# Patient Record
Sex: FEMALE | Race: WHITE | NOT HISPANIC OR LATINO | Employment: OTHER | URBAN - METROPOLITAN AREA
[De-identification: names, ages, dates, MRNs, and addresses within clinical notes are randomized per-mention and may not be internally consistent; named-entity substitution may affect disease eponyms.]

---

## 2018-03-23 ENCOUNTER — APPOINTMENT (OUTPATIENT)
Dept: GENERAL RADIOLOGY | Facility: HOSPITAL | Age: 28
End: 2018-03-23

## 2018-03-23 ENCOUNTER — APPOINTMENT (OUTPATIENT)
Dept: ULTRASOUND IMAGING | Facility: HOSPITAL | Age: 28
End: 2018-03-23

## 2018-03-23 ENCOUNTER — HOSPITAL ENCOUNTER (EMERGENCY)
Facility: HOSPITAL | Age: 28
Discharge: LEFT BEFORE DISCHARGE | End: 2018-03-23
Attending: EMERGENCY MEDICINE | Admitting: EMERGENCY MEDICINE

## 2018-03-23 VITALS
BODY MASS INDEX: 30.12 KG/M2 | OXYGEN SATURATION: 100 % | RESPIRATION RATE: 28 BRPM | HEIGHT: 63 IN | WEIGHT: 170 LBS | SYSTOLIC BLOOD PRESSURE: 141 MMHG | TEMPERATURE: 100.1 F | DIASTOLIC BLOOD PRESSURE: 90 MMHG | HEART RATE: 126 BPM

## 2018-03-23 DIAGNOSIS — D64.9 SYMPTOMATIC ANEMIA: ICD-10-CM

## 2018-03-23 DIAGNOSIS — F19.10 POLYSUBSTANCE ABUSE (HCC): Primary | ICD-10-CM

## 2018-03-23 DIAGNOSIS — R18.8 OTHER ASCITES: ICD-10-CM

## 2018-03-23 DIAGNOSIS — E88.09 HYPOALBUMINEMIA: ICD-10-CM

## 2018-03-23 DIAGNOSIS — R19.5 OCCULT BLOOD POSITIVE STOOL: ICD-10-CM

## 2018-03-23 DIAGNOSIS — Z86.19 HISTORY OF HEPATITIS C: ICD-10-CM

## 2018-03-23 DIAGNOSIS — R00.0 TACHYCARDIA: ICD-10-CM

## 2018-03-23 LAB
ABO GROUP BLD: NORMAL
ALBUMIN SERPL-MCNC: 2.5 G/DL (ref 3.2–4.8)
ALBUMIN/GLOB SERPL: 0.8 G/DL (ref 1.5–2.5)
ALP SERPL-CCNC: 567 U/L (ref 25–100)
ALT SERPL W P-5'-P-CCNC: 60 U/L (ref 7–40)
AMMONIA BLD-SCNC: 45 UMOL/L (ref 19–60)
AMPHET+METHAMPHET UR QL: NEGATIVE
AMPHETAMINES UR QL: NEGATIVE
ANION GAP SERPL CALCULATED.3IONS-SCNC: 6 MMOL/L (ref 3–11)
AST SERPL-CCNC: 64 U/L (ref 0–33)
B-HCG UR QL: NEGATIVE
BACTERIA UR QL AUTO: ABNORMAL /HPF
BARBITURATES UR QL SCN: NEGATIVE
BASOPHILS # BLD AUTO: 0.01 10*3/MM3 (ref 0–0.2)
BASOPHILS NFR BLD AUTO: 0.3 % (ref 0–1)
BENZODIAZ UR QL SCN: POSITIVE
BILIRUB SERPL-MCNC: 1.7 MG/DL (ref 0.3–1.2)
BILIRUB UR QL STRIP: ABNORMAL
BLD GP AB SCN SERPL QL: NEGATIVE
BUN BLD-MCNC: 19 MG/DL (ref 9–23)
BUN/CREAT SERPL: 23.8 (ref 7–25)
BUPRENORPHINE SERPL-MCNC: NEGATIVE NG/ML
CALCIUM SPEC-SCNC: 7.5 MG/DL (ref 8.7–10.4)
CANNABINOIDS SERPL QL: NEGATIVE
CHLORIDE SERPL-SCNC: 115 MMOL/L (ref 99–109)
CLARITY UR: CLEAR
CO2 SERPL-SCNC: 16 MMOL/L (ref 20–31)
COCAINE UR QL: NEGATIVE
COLOR UR: ABNORMAL
CREAT BLD-MCNC: 0.8 MG/DL (ref 0.6–1.3)
DEPRECATED RDW RBC AUTO: 49.1 FL (ref 37–54)
DEVELOPER EXPIRATION DATE: ABNORMAL
DEVELOPER LOT NUMBER: ABNORMAL
EOSINOPHIL # BLD AUTO: 0.04 10*3/MM3 (ref 0–0.3)
EOSINOPHIL NFR BLD AUTO: 1 % (ref 0–3)
ERYTHROCYTE [DISTWIDTH] IN BLOOD BY AUTOMATED COUNT: 16.4 % (ref 11.3–14.5)
EXPIRATION DATE: ABNORMAL
FECAL OCCULT BLOOD SCREEN, POC: POSITIVE
GFR SERPL CREATININE-BSD FRML MDRD: 86 ML/MIN/1.73
GLOBULIN UR ELPH-MCNC: 3.1 GM/DL
GLUCOSE BLD-MCNC: 111 MG/DL (ref 70–100)
GLUCOSE UR STRIP-MCNC: NEGATIVE MG/DL
HCT VFR BLD AUTO: 24 % (ref 34.5–44)
HGB BLD-MCNC: 8.2 G/DL (ref 11.5–15.5)
HGB UR QL STRIP.AUTO: ABNORMAL
HYALINE CASTS UR QL AUTO: ABNORMAL /LPF
IMM GRANULOCYTES # BLD: 0.01 10*3/MM3 (ref 0–0.03)
IMM GRANULOCYTES NFR BLD: 0.3 % (ref 0–0.6)
INR PPP: 1.09 (ref 0.91–1.09)
KETONES UR QL STRIP: NEGATIVE
LEUKOCYTE ESTERASE UR QL STRIP.AUTO: ABNORMAL
LYMPHOCYTES # BLD AUTO: 0.5 10*3/MM3 (ref 0.6–4.8)
LYMPHOCYTES NFR BLD AUTO: 12.8 % (ref 24–44)
Lab: ABNORMAL
MCH RBC QN AUTO: 28.3 PG (ref 27–31)
MCHC RBC AUTO-ENTMCNC: 34.2 G/DL (ref 32–36)
MCV RBC AUTO: 82.8 FL (ref 80–99)
METHADONE UR QL SCN: NEGATIVE
MONOCYTES # BLD AUTO: 0.41 10*3/MM3 (ref 0–1)
MONOCYTES NFR BLD AUTO: 10.5 % (ref 0–12)
NEGATIVE CONTROL: NEGATIVE
NEUTROPHILS # BLD AUTO: 2.95 10*3/MM3 (ref 1.5–8.3)
NEUTROPHILS NFR BLD AUTO: 75.1 % (ref 41–71)
NITRITE UR QL STRIP: NEGATIVE
OPIATES UR QL: POSITIVE
OXYCODONE UR QL SCN: NEGATIVE
PCP UR QL SCN: NEGATIVE
PH UR STRIP.AUTO: <=5 [PH] (ref 5–8)
PLATELET # BLD AUTO: 57 10*3/MM3 (ref 150–450)
PMV BLD AUTO: 10.4 FL (ref 6–12)
POSITIVE CONTROL: POSITIVE
POTASSIUM BLD-SCNC: 3.4 MMOL/L (ref 3.5–5.5)
PROPOXYPH UR QL: NEGATIVE
PROT SERPL-MCNC: 5.6 G/DL (ref 5.7–8.2)
PROT UR QL STRIP: ABNORMAL
PROTHROMBIN TIME: 11.4 SECONDS (ref 9.6–11.5)
RBC # BLD AUTO: 2.9 10*6/MM3 (ref 3.89–5.14)
RBC # UR: ABNORMAL /HPF
REF LAB TEST METHOD: ABNORMAL
RH BLD: POSITIVE
S PYO AG THROAT QL: NEGATIVE
SODIUM BLD-SCNC: 137 MMOL/L (ref 132–146)
SP GR UR STRIP: 1.02 (ref 1–1.03)
SQUAMOUS #/AREA URNS HPF: ABNORMAL /HPF
TRICYCLICS UR QL SCN: NEGATIVE
UROBILINOGEN UR QL STRIP: ABNORMAL
WBC NRBC COR # BLD: 3.92 10*3/MM3 (ref 3.5–10.8)
WBC UR QL AUTO: ABNORMAL /HPF

## 2018-03-23 PROCEDURE — 81001 URINALYSIS AUTO W/SCOPE: CPT | Performed by: PHYSICIAN ASSISTANT

## 2018-03-23 PROCEDURE — 80306 DRUG TEST PRSMV INSTRMNT: CPT | Performed by: PHYSICIAN ASSISTANT

## 2018-03-23 PROCEDURE — 96360 HYDRATION IV INFUSION INIT: CPT

## 2018-03-23 PROCEDURE — 85025 COMPLETE CBC W/AUTO DIFF WBC: CPT | Performed by: PHYSICIAN ASSISTANT

## 2018-03-23 PROCEDURE — 82140 ASSAY OF AMMONIA: CPT | Performed by: PHYSICIAN ASSISTANT

## 2018-03-23 PROCEDURE — 86901 BLOOD TYPING SEROLOGIC RH(D): CPT | Performed by: PHYSICIAN ASSISTANT

## 2018-03-23 PROCEDURE — 71045 X-RAY EXAM CHEST 1 VIEW: CPT

## 2018-03-23 PROCEDURE — 76705 ECHO EXAM OF ABDOMEN: CPT

## 2018-03-23 PROCEDURE — 81025 URINE PREGNANCY TEST: CPT | Performed by: PHYSICIAN ASSISTANT

## 2018-03-23 PROCEDURE — 82270 OCCULT BLOOD FECES: CPT | Performed by: PHYSICIAN ASSISTANT

## 2018-03-23 PROCEDURE — 87081 CULTURE SCREEN ONLY: CPT | Performed by: PHYSICIAN ASSISTANT

## 2018-03-23 PROCEDURE — 99285 EMERGENCY DEPT VISIT HI MDM: CPT

## 2018-03-23 PROCEDURE — 85610 PROTHROMBIN TIME: CPT | Performed by: PHYSICIAN ASSISTANT

## 2018-03-23 PROCEDURE — 87880 STREP A ASSAY W/OPTIC: CPT | Performed by: PHYSICIAN ASSISTANT

## 2018-03-23 PROCEDURE — 80053 COMPREHEN METABOLIC PANEL: CPT | Performed by: PHYSICIAN ASSISTANT

## 2018-03-23 PROCEDURE — 86850 RBC ANTIBODY SCREEN: CPT | Performed by: PHYSICIAN ASSISTANT

## 2018-03-23 PROCEDURE — 86900 BLOOD TYPING SEROLOGIC ABO: CPT | Performed by: PHYSICIAN ASSISTANT

## 2018-03-23 PROCEDURE — 93005 ELECTROCARDIOGRAM TRACING: CPT | Performed by: PHYSICIAN ASSISTANT

## 2018-03-23 RX ORDER — NICOTINE 21 MG/24HR
1 PATCH, TRANSDERMAL 24 HOURS TRANSDERMAL EVERY 24 HOURS
Status: DISCONTINUED | OUTPATIENT
Start: 2018-03-23 | End: 2018-03-23 | Stop reason: HOSPADM

## 2018-03-23 RX ADMIN — SODIUM CHLORIDE 500 ML: 9 INJECTION, SOLUTION INTRAVENOUS at 03:35

## 2018-03-23 RX ADMIN — NICOTINE 1 PATCH: 21 PATCH, EXTENDED RELEASE TRANSDERMAL at 05:15

## 2018-03-23 NOTE — ED PROVIDER NOTES
Subjective   This is a 27-year-old  female that presents to the ER initially for medical clearance for detox.  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  She had her boyfriend presented to [facility] for detox, and due to her medical condition, they referred her here to the ER for evaluation.  She has history of congenital liver abnormality with surgery at [facility] as a child, as well as history of hepatitis C from IV drug abuse.  According to her mother, she is noncompliant with her medical regimen due to drug abuse.  She is supposed to be on Aldactone, lisinopril, propranolol, and Zoloft.  She used to see Dr. Jimenez, whom patient states was a liver specialist at .  According to mom, she was even on the liver transplant list.  Due to drug abuse, she was removed from the transplant list.  Over the last several weeks, patient has had worsening abdominal distention and increased pedal edema.  Patient also has had increasing shortness of breath for the last few days.  Her skin is jaundiced, and patient also reports that urine is tea colored.  She denies any fever or chills.  She denies any bowel changes.  She reports generalized weakness, but denies any near-syncope or syncope.        History provided by:  Patient and parent  History limited by:  Acuity of condition  Illness   Location:  Patient requests detox from Heroin and Xanax, but sent here for medical clearance.  Positive jaundice, abdominal distention, SOA, and pedal edema.  History of Hep C and congenital liver abnormality with previous surgery.  Severity:  Moderate  Onset quality:  Gradual  Duration: Several days for abdominal distention, but noncompliant with meds due to drug use.  Timing:  Constant  Chronicity:  Chronic  Context:  HIstory of drug use with heroin and xanax, but also history of congenital liver abnormality with surgery as a child at [facility].  Medical non-compliance with all meds due to drug use.  Relieved  by:  Nothing  Worsened by:  Nothing  Ineffective treatments:  None tried  Associated symptoms: fatigue and shortness of breath    Associated symptoms: no abdominal pain, no chest pain, no cough, no diarrhea, no fever, no headaches, no loss of consciousness, no nausea and no vomiting    Associated symptoms comment:  Abdominal distention, increased pedal edema, rhonchi, and jaundice      Review of Systems   Constitutional: Positive for activity change (decreased activity), appetite change (anorexia) and fatigue. Negative for fever.   Respiratory: Positive for shortness of breath. Negative for cough and chest tightness.    Cardiovascular: Positive for palpitations and leg swelling (2+ pedal edema). Negative for chest pain.   Gastrointestinal: Positive for abdominal distention. Negative for abdominal pain, blood in stool, constipation, diarrhea, nausea and vomiting.        History of Hep C, as well as congenital liver abnormality with surgery as a child at [facility].   Genitourinary: Negative for decreased urine volume, flank pain, frequency and urgency.        Urine is dark and tea-colored.     Musculoskeletal: Negative.    Skin: Positive for color change (jaundice noted to skin).   Neurological: Positive for weakness (generally weak). Negative for dizziness, loss of consciousness, syncope and headaches.   Psychiatric/Behavioral:        History of heroin and xanax abuse.       Past Medical History:   Diagnosis Date   • Biliary atresia    • Hepatitis C    • Renal disorder        No Known Allergies    History reviewed. No pertinent surgical history.    History reviewed. No pertinent family history.    Social History     Social History   • Marital status: Single     Social History Main Topics   • Smoking status: Current Every Day Smoker     Packs/day: 1.00     Types: Cigarettes   • Smokeless tobacco: Never Used   • Alcohol use No   • Drug use:      Types: IV      Comment: heroin and xanax last used yesterday at 4 pm    • Sexual activity: Defer     Other Topics Concern   • Not on file           Objective   Physical Exam   Constitutional: She is oriented to person, place, and time. She appears well-developed and well-nourished. No distress.   HENT:   Head: Normocephalic and atraumatic.   Mouth/Throat: Mucous membranes are dry.   Eyes: Conjunctivae and EOM are normal. Pupils are equal, round, and reactive to light. No scleral icterus.   Neck: Normal range of motion. Neck supple.   Cardiovascular: Regular rhythm and normal heart sounds.  Tachycardia present.    2+ pedal edema to BLE   Pulmonary/Chest: Effort normal. No tachypnea. No respiratory distress. She has no decreased breath sounds. She has no wheezes. She has rhonchi in the right upper field, the right lower field, the left upper field and the left lower field.   Abdominal: Soft. Bowel sounds are normal. She exhibits distension and ascites. There is no tenderness. There is no rigidity, no rebound, no guarding and no CVA tenderness. No hernia.   Genitourinary: Rectal exam shows guaiac positive stool.   Genitourinary Comments: No gross blood on digital rectal exam, but hemoccult slightly positive.  Reviewed with Dr. Conner.   Musculoskeletal: Normal range of motion. She exhibits no edema.   Lymphadenopathy:     She has no cervical adenopathy.   Neurological: She is alert and oriented to person, place, and time. She has normal strength. No cranial nerve deficit or sensory deficit.   Patient appears generally weak.  She also appears drowsy and high secondary to recent heroin use.   Skin: Skin is warm and dry. She is not diaphoretic.   Jaundice noted to skin.   Psychiatric: She has a normal mood and affect. Her behavior is normal.   Nursing note and vitals reviewed.      Procedures         ED Course  ED Course   Comment By Time   Workup reveals H&H of 8 and 24.  Platelet count is 57,000 chemistries were low potassium level of 3.4.  Bicarbonate is 16.  Albumin is 2.5.  ALP is 60,  "AST is 64, and alkaline phosphatase is 567.  Total bilirubin is 1.7.  There are no previous labs for comparison.  INR is 1.09 and ammonia level is 45.  I will discuss the case with Dr. Conner. Mariola Holguin PA-C 03/23 7033   Discussed the case with Dr. Conner.  H&H is 8/24.  We will perform stool guiac.  Patient, according to mom, has history of anemia and has required blood transfusions in the past.   Mariola Holguin PA-C 03/23 4618   Performed digital rectal exam.  No gross blood, but stool guiac was slightly positive.  Patient requested nicotine patch.  Paged Hospitalist, Dr. Fernandez, to discuss admission.  Ordered Nicoderm 21mg patch, as well as Type & Screen.  I will also give a low-dose beta blocker for tachycardia, Metoprolol 12.5mg po. Mariola Holguin PA-C 03/23 0506   Abdominal ultrasound revealed minimal ascites and splenomegaly.  CXR shows no pulmonary vascular congestion.   Mariola Holguin PA-C 03/23 8853   Patient stood up and stated that she was going to go out and smoke.  When she stood up, she was more tachycardic and tachypneic.  Dr. Conner stated that she does not look stable for discharge. We will obtain ambulatory vitals and call Dr. Fernandez.  I actually discussed the case with Dr. Fernandez, and she feels that these medical conditions are chronic.  Transfer of care to Dr. Conner, and he will continue to observe her after Lopressor is given and continue to closely watch vital signs. Mariola Holguin PA-C 03/23 7221   After spending an extensive period discussing pt multiple active medicaiton conditions and her poor prognosis if she decides to leave AMA, pt states that she has a family emergency that she has to address and she is unwilling to stay in the ED.  She fully understands the risk of death.  Pt denies suicidal or homicidal ideation and states that she will return to a hospital after she addresses her \"family issue\". John Conner,  03/23 7674        No results found for this or any previous visit (from the past " 24 hour(s)).  Note: In addition to lab results from this visit, the labs listed above may include labs taken at another facility or during a different encounter within the last 24 hours. Please correlate lab times with ED admission and discharge times for further clarification of the services performed during this visit.    US Abdomen Limited   Final Result      Minimal ascites as discussed above.      Splenomegaly.         THIS DOCUMENT HAS BEEN ELECTRONICALLY SIGNED BY JESSICA POSADA MD      XR Chest 1 View   Final Result      No evidence of acute cardiopulmonary disease.      THIS DOCUMENT HAS BEEN ELECTRONICALLY SIGNED BY JESSICA POSADA MD        Vitals:    03/23/18 0337 03/23/18 0348 03/23/18 0400 03/23/18 0500   BP:   120/72 141/90   BP Location:       Patient Position:       Pulse: (!) 121 (!) 128 (!) 126    Resp:       Temp:       TempSrc:       SpO2: 100% 100% 100% 100%   Weight:       Height:         Medications   sodium chloride 0.9 % bolus 500 mL (0 mL Intravenous Stopped 3/23/18 0435)     ECG/EMG Results (last 24 hours)     Procedure Component Value Units Date/Time    ECG 12 Lead [[id number]] Collected:  03/23/18 0322     Updated:  03/23/18 0324                  MDM    Final diagnoses:   Polysubstance abuse   Tachycardia   History of hepatitis C   Other ascites   Occult blood positive stool   Hypoalbuminemia   Symptomatic anemia            Mariola Holguin PA-C  03/24/18 1056

## 2018-03-25 LAB — BACTERIA SPEC AEROBE CULT: NORMAL

## 2019-05-10 ENCOUNTER — APPOINTMENT (OUTPATIENT)
Dept: GENERAL RADIOLOGY | Facility: HOSPITAL | Age: 29
End: 2019-05-10

## 2019-05-10 ENCOUNTER — HOSPITAL ENCOUNTER (INPATIENT)
Facility: HOSPITAL | Age: 29
LOS: 2 days | Discharge: LEFT BEFORE DISCHARGE | End: 2019-05-12
Attending: EMERGENCY MEDICINE | Admitting: INTERNAL MEDICINE

## 2019-05-10 DIAGNOSIS — F19.90 IVDU (INTRAVENOUS DRUG USER): ICD-10-CM

## 2019-05-10 DIAGNOSIS — A41.9 SEPSIS, DUE TO UNSPECIFIED ORGANISM: Primary | ICD-10-CM

## 2019-05-10 LAB
AMPHET+METHAMPHET UR QL: NEGATIVE
AMPHETAMINES UR QL: NEGATIVE
BACTERIA UR QL AUTO: ABNORMAL /HPF
BARBITURATES UR QL SCN: NEGATIVE
BENZODIAZ UR QL SCN: NEGATIVE
BILIRUB UR QL STRIP: ABNORMAL
BUPRENORPHINE SERPL-MCNC: NEGATIVE NG/ML
CANNABINOIDS SERPL QL: NEGATIVE
CLARITY UR: CLEAR
COCAINE UR QL: NEGATIVE
COLOR UR: ABNORMAL
GLUCOSE UR STRIP-MCNC: NEGATIVE MG/DL
HGB UR QL STRIP.AUTO: ABNORMAL
HYALINE CASTS UR QL AUTO: ABNORMAL /LPF
KETONES UR QL STRIP: NEGATIVE
LEUKOCYTE ESTERASE UR QL STRIP.AUTO: ABNORMAL
METHADONE UR QL SCN: NEGATIVE
NITRITE UR QL STRIP: NEGATIVE
OPIATES UR QL: POSITIVE
OXYCODONE UR QL SCN: NEGATIVE
PCP UR QL SCN: NEGATIVE
PH UR STRIP.AUTO: 7 [PH] (ref 5–8)
PROPOXYPH UR QL: NEGATIVE
PROT UR QL STRIP: ABNORMAL
RBC # UR: ABNORMAL /HPF
REF LAB TEST METHOD: ABNORMAL
SP GR UR STRIP: 1.01 (ref 1–1.03)
SQUAMOUS #/AREA URNS HPF: ABNORMAL /HPF
TRICYCLICS UR QL SCN: NEGATIVE
UROBILINOGEN UR QL STRIP: ABNORMAL
WBC UR QL AUTO: ABNORMAL /HPF

## 2019-05-10 PROCEDURE — 99285 EMERGENCY DEPT VISIT HI MDM: CPT

## 2019-05-10 PROCEDURE — 73130 X-RAY EXAM OF HAND: CPT

## 2019-05-10 PROCEDURE — 81001 URINALYSIS AUTO W/SCOPE: CPT | Performed by: EMERGENCY MEDICINE

## 2019-05-10 PROCEDURE — 80306 DRUG TEST PRSMV INSTRMNT: CPT | Performed by: EMERGENCY MEDICINE

## 2019-05-10 PROCEDURE — 87804 INFLUENZA ASSAY W/OPTIC: CPT | Performed by: EMERGENCY MEDICINE

## 2019-05-10 RX ORDER — SODIUM CHLORIDE 0.9 % (FLUSH) 0.9 %
10 SYRINGE (ML) INJECTION AS NEEDED
Status: DISCONTINUED | OUTPATIENT
Start: 2019-05-10 | End: 2019-05-12 | Stop reason: HOSPADM

## 2019-05-10 RX ORDER — ACETAMINOPHEN 500 MG
1000 TABLET ORAL ONCE
Status: COMPLETED | OUTPATIENT
Start: 2019-05-10 | End: 2019-05-10

## 2019-05-10 RX ADMIN — ACETAMINOPHEN 1000 MG: 500 TABLET, FILM COATED ORAL at 23:36

## 2019-05-11 ENCOUNTER — APPOINTMENT (OUTPATIENT)
Dept: CARDIOLOGY | Facility: HOSPITAL | Age: 29
End: 2019-05-11

## 2019-05-11 ENCOUNTER — APPOINTMENT (OUTPATIENT)
Dept: CT IMAGING | Facility: HOSPITAL | Age: 29
End: 2019-05-11

## 2019-05-11 ENCOUNTER — APPOINTMENT (OUTPATIENT)
Dept: GENERAL RADIOLOGY | Facility: HOSPITAL | Age: 29
End: 2019-05-11

## 2019-05-11 PROBLEM — A41.9 SEPSIS (HCC): Status: ACTIVE | Noted: 2019-05-11

## 2019-05-11 PROBLEM — D69.6 THROMBOCYTOPENIA (HCC): Status: ACTIVE | Noted: 2019-05-11

## 2019-05-11 PROBLEM — K72.90 LIVER FAILURE (HCC): Status: ACTIVE | Noted: 2019-05-11

## 2019-05-11 PROBLEM — D64.9 NORMOCYTIC ANEMIA: Status: ACTIVE | Noted: 2019-05-11

## 2019-05-11 PROBLEM — D73.5 SPLENIC INFARCTION: Status: ACTIVE | Noted: 2019-05-11

## 2019-05-11 PROBLEM — Q44.2 BILIARY ATRESIA: Status: ACTIVE | Noted: 2019-05-11

## 2019-05-11 PROBLEM — F19.10 POLYSUBSTANCE ABUSE (HCC): Status: ACTIVE | Noted: 2019-05-11

## 2019-05-11 PROBLEM — B19.20 HEPATITIS C: Status: ACTIVE | Noted: 2019-05-11

## 2019-05-11 LAB
25(OH)D3 SERPL-MCNC: 13 NG/ML (ref 30–100)
ABO GROUP BLD: NORMAL
ALBUMIN SERPL-MCNC: 2.1 G/DL (ref 3.5–5.2)
ALBUMIN SERPL-MCNC: 2.1 G/DL (ref 3.5–5.2)
ALBUMIN/GLOB SERPL: 0.6 G/DL
ALBUMIN/GLOB SERPL: 0.8 G/DL
ALP SERPL-CCNC: 425 U/L (ref 39–117)
ALP SERPL-CCNC: 464 U/L (ref 39–117)
ALT SERPL W P-5'-P-CCNC: 20 U/L (ref 1–33)
ALT SERPL W P-5'-P-CCNC: 21 U/L (ref 1–33)
ANION GAP SERPL CALCULATED.3IONS-SCNC: 8 MMOL/L
ANION GAP SERPL CALCULATED.3IONS-SCNC: 8 MMOL/L
APTT PPP: 41 SECONDS (ref 24–37)
AST SERPL-CCNC: 41 U/L (ref 1–32)
AST SERPL-CCNC: 44 U/L (ref 1–32)
BASOPHILS # BLD AUTO: 0 10*3/MM3 (ref 0–0.2)
BASOPHILS # BLD AUTO: 0.01 10*3/MM3 (ref 0–0.2)
BASOPHILS # BLD AUTO: 0.01 10*3/MM3 (ref 0–0.2)
BASOPHILS NFR BLD AUTO: 0 % (ref 0–1.5)
BASOPHILS NFR BLD AUTO: 0.4 % (ref 0–1.5)
BASOPHILS NFR BLD AUTO: 0.4 % (ref 0–1.5)
BH CV ECHO MEAS - AO ROOT AREA (BSA CORRECTED): 1.7
BH CV ECHO MEAS - AO ROOT AREA: 7.8 CM^2
BH CV ECHO MEAS - AO ROOT DIAM: 3.2 CM
BH CV ECHO MEAS - BSA(HAYCOCK): 1.9 M^2
BH CV ECHO MEAS - BSA(HAYCOCK): 1.9 M^2
BH CV ECHO MEAS - BSA: 1.8 M^2
BH CV ECHO MEAS - BSA: 1.8 M^2
BH CV ECHO MEAS - BZI_BMI: 30.1 KILOGRAMS/M^2
BH CV ECHO MEAS - BZI_BMI: 30.1 KILOGRAMS/M^2
BH CV ECHO MEAS - BZI_METRIC_HEIGHT: 160 CM
BH CV ECHO MEAS - BZI_METRIC_HEIGHT: 160 CM
BH CV ECHO MEAS - BZI_METRIC_WEIGHT: 77.1 KG
BH CV ECHO MEAS - BZI_METRIC_WEIGHT: 77.1 KG
BH CV ECHO MEAS - EDV(CUBED): 145.8 ML
BH CV ECHO MEAS - EDV(MOD-SP2): 119 ML
BH CV ECHO MEAS - EDV(MOD-SP4): 137 ML
BH CV ECHO MEAS - EDV(TEICH): 133.2 ML
BH CV ECHO MEAS - EF(CUBED): 64.8 %
BH CV ECHO MEAS - EF(MOD-BP): 61 %
BH CV ECHO MEAS - EF(MOD-SP2): 63.9 %
BH CV ECHO MEAS - EF(MOD-SP4): 58.4 %
BH CV ECHO MEAS - EF(TEICH): 55.9 %
BH CV ECHO MEAS - ESV(CUBED): 51.3 ML
BH CV ECHO MEAS - ESV(MOD-SP2): 43 ML
BH CV ECHO MEAS - ESV(MOD-SP4): 57 ML
BH CV ECHO MEAS - ESV(TEICH): 58.7 ML
BH CV ECHO MEAS - FS: 29.4 %
BH CV ECHO MEAS - IVS/LVPW: 1.1
BH CV ECHO MEAS - IVSD: 1.2 CM
BH CV ECHO MEAS - LA DIMENSION: 4.5 CM
BH CV ECHO MEAS - LA/AO: 1.4
BH CV ECHO MEAS - LAD MAJOR: 6.7 CM
BH CV ECHO MEAS - LAT PEAK E' VEL: 11.6 CM/SEC
BH CV ECHO MEAS - LATERAL E/E' RATIO: 9.7
BH CV ECHO MEAS - LV DIASTOLIC VOL/BSA (35-75): 75.9 ML/M^2
BH CV ECHO MEAS - LV MASS(C)D: 290 GRAMS
BH CV ECHO MEAS - LV MASS(C)DI: 160.7 GRAMS/M^2
BH CV ECHO MEAS - LV MAX PG: 6.9 MMHG
BH CV ECHO MEAS - LV MEAN PG: 3.7 MMHG
BH CV ECHO MEAS - LV SYSTOLIC VOL/BSA (12-30): 31.6 ML/M^2
BH CV ECHO MEAS - LV V1 MAX: 131.8 CM/SEC
BH CV ECHO MEAS - LV V1 MEAN: 90.6 CM/SEC
BH CV ECHO MEAS - LV V1 VTI: 32.1 CM
BH CV ECHO MEAS - LVIDD: 5.3 CM
BH CV ECHO MEAS - LVIDS: 3.7 CM
BH CV ECHO MEAS - LVLD AP2: 9.3 CM
BH CV ECHO MEAS - LVLD AP4: 9.2 CM
BH CV ECHO MEAS - LVLS AP2: 7.9 CM
BH CV ECHO MEAS - LVLS AP4: 7.5 CM
BH CV ECHO MEAS - LVOT AREA (M): 3.1 CM^2
BH CV ECHO MEAS - LVOT AREA: 3.1 CM^2
BH CV ECHO MEAS - LVOT DIAM: 2 CM
BH CV ECHO MEAS - LVPWD: 1.2 CM
BH CV ECHO MEAS - MED PEAK E' VEL: 8.4 CM/SEC
BH CV ECHO MEAS - MEDIAL E/E' RATIO: 13.2
BH CV ECHO MEAS - MV A MAX VEL: 95.3 CM/SEC
BH CV ECHO MEAS - MV DEC TIME: 0.24 SEC
BH CV ECHO MEAS - MV E MAX VEL: 113.5 CM/SEC
BH CV ECHO MEAS - MV E/A: 1.2
BH CV ECHO MEAS - PA ACC SLOPE: 733.3 CM/SEC^2
BH CV ECHO MEAS - PA ACC TIME: 0.14 SEC
BH CV ECHO MEAS - PA PR(ACCEL): 17.2 MMHG
BH CV ECHO MEAS - RVDD: 2 CM
BH CV ECHO MEAS - SI(CUBED): 52.4 ML/M^2
BH CV ECHO MEAS - SI(LVOT): 55.6 ML/M^2
BH CV ECHO MEAS - SI(MOD-SP2): 42.1 ML/M^2
BH CV ECHO MEAS - SI(MOD-SP4): 44.3 ML/M^2
BH CV ECHO MEAS - SI(TEICH): 41.3 ML/M^2
BH CV ECHO MEAS - SV(CUBED): 94.5 ML
BH CV ECHO MEAS - SV(LVOT): 100.4 ML
BH CV ECHO MEAS - SV(MOD-SP2): 76 ML
BH CV ECHO MEAS - SV(MOD-SP4): 80 ML
BH CV ECHO MEAS - SV(TEICH): 74.5 ML
BH CV ECHO MEAS - TAPSE (>1.6): 3.1 CM2
BH CV ECHO MEASUREMENTS AVERAGE E/E' RATIO: 11.35
BH CV UPPER VENOUS LEFT AXILLARY AUGMENT: NORMAL
BH CV UPPER VENOUS LEFT AXILLARY COMPRESS: NORMAL
BH CV UPPER VENOUS LEFT AXILLARY PHASIC: NORMAL
BH CV UPPER VENOUS LEFT AXILLARY SPONT: NORMAL
BH CV UPPER VENOUS LEFT BASILIC FOREARM COMPRESS: NORMAL
BH CV UPPER VENOUS LEFT BASILIC UPPER COMPRESS: NORMAL
BH CV UPPER VENOUS LEFT BRACHIAL COMPRESS: NORMAL
BH CV UPPER VENOUS LEFT CEPHALIC FOREARM COMPRESS: NORMAL
BH CV UPPER VENOUS LEFT CEPHALIC UPPER COMPRESS: NORMAL
BH CV UPPER VENOUS LEFT INTERNAL JUGULAR AUGMENT: NORMAL
BH CV UPPER VENOUS LEFT INTERNAL JUGULAR COMPRESS: NORMAL
BH CV UPPER VENOUS LEFT INTERNAL JUGULAR PHASIC: NORMAL
BH CV UPPER VENOUS LEFT INTERNAL JUGULAR SPONT: NORMAL
BH CV UPPER VENOUS LEFT RADIAL COMPRESS: NORMAL
BH CV UPPER VENOUS LEFT SUBCLAVIAN AUGMENT: NORMAL
BH CV UPPER VENOUS LEFT SUBCLAVIAN COMPRESS: NORMAL
BH CV UPPER VENOUS LEFT SUBCLAVIAN PHASIC: NORMAL
BH CV UPPER VENOUS LEFT SUBCLAVIAN SPONT: NORMAL
BH CV UPPER VENOUS LEFT ULNAR COMPRESS: NORMAL
BH CV UPPER VENOUS RIGHT SUBCLAVIAN AUGMENT: NORMAL
BH CV UPPER VENOUS RIGHT SUBCLAVIAN COMPRESS: NORMAL
BH CV UPPER VENOUS RIGHT SUBCLAVIAN PHASIC: NORMAL
BH CV UPPER VENOUS RIGHT SUBCLAVIAN SPONT: NORMAL
BH CV VAS BP LEFT ARM: NORMAL MMHG
BH CV XLRA - RV BASE: 3.6 CM
BH CV XLRA - RV LENGTH: 5.3 CM
BH CV XLRA - RV MID: 2.6 CM
BH CV XLRA - TDI S': 19.2 CM/SEC
BILIRUB SERPL-MCNC: 1.1 MG/DL (ref 0.2–1.2)
BILIRUB SERPL-MCNC: 1.4 MG/DL (ref 0.2–1.2)
BLD GP AB SCN SERPL QL: NEGATIVE
BUN BLD-MCNC: 16 MG/DL (ref 6–20)
BUN BLD-MCNC: 17 MG/DL (ref 6–20)
BUN/CREAT SERPL: 19.3 (ref 7–25)
BUN/CREAT SERPL: 21.5 (ref 7–25)
CA-I SERPL ISE-MCNC: 1.14 MMOL/L (ref 1.12–1.32)
CALCIUM SPEC-SCNC: 7.3 MG/DL (ref 8.6–10.5)
CALCIUM SPEC-SCNC: 8 MG/DL (ref 8.6–10.5)
CHLORIDE SERPL-SCNC: 111 MMOL/L (ref 98–107)
CHLORIDE SERPL-SCNC: 114 MMOL/L (ref 98–107)
CO2 SERPL-SCNC: 21 MMOL/L (ref 22–29)
CO2 SERPL-SCNC: 22 MMOL/L (ref 22–29)
CREAT BLD-MCNC: 0.79 MG/DL (ref 0.57–1)
CREAT BLD-MCNC: 0.83 MG/DL (ref 0.57–1)
CYTOLOGIST CVX/VAG CYTO: NORMAL
D DIMER PPP FEU-MCNC: 2.49 MCGFEU/ML (ref 0–0.56)
D-LACTATE SERPL-SCNC: 0.8 MMOL/L (ref 0.5–2)
DEPRECATED RDW RBC AUTO: 49.7 FL (ref 37–54)
DEPRECATED RDW RBC AUTO: 52.1 FL (ref 37–54)
DEPRECATED RDW RBC AUTO: 53.2 FL (ref 37–54)
EOSINOPHIL # BLD AUTO: 0.07 10*3/MM3 (ref 0–0.4)
EOSINOPHIL # BLD AUTO: 0.07 10*3/MM3 (ref 0–0.4)
EOSINOPHIL # BLD AUTO: 0.12 10*3/MM3 (ref 0–0.4)
EOSINOPHIL NFR BLD AUTO: 2 % (ref 0.3–6.2)
EOSINOPHIL NFR BLD AUTO: 2.8 % (ref 0.3–6.2)
EOSINOPHIL NFR BLD AUTO: 5 % (ref 0.3–6.2)
ERYTHROCYTE [DISTWIDTH] IN BLOOD BY AUTOMATED COUNT: 16.1 % (ref 12.3–15.4)
ERYTHROCYTE [DISTWIDTH] IN BLOOD BY AUTOMATED COUNT: 16.5 % (ref 12.3–15.4)
ERYTHROCYTE [DISTWIDTH] IN BLOOD BY AUTOMATED COUNT: 16.7 % (ref 12.3–15.4)
FERRITIN SERPL-MCNC: 88.1 NG/ML (ref 13–150)
FLUAV AG NPH QL: NEGATIVE
FLUBV AG NPH QL IA: NEGATIVE
FOLATE SERPL-MCNC: 8.25 NG/ML (ref 4.78–24.2)
GFR SERPL CREATININE-BSD FRML MDRD: 82 ML/MIN/1.73
GFR SERPL CREATININE-BSD FRML MDRD: 87 ML/MIN/1.73
GLOBULIN UR ELPH-MCNC: 2.8 GM/DL
GLOBULIN UR ELPH-MCNC: 3.7 GM/DL
GLUCOSE BLD-MCNC: 109 MG/DL (ref 65–99)
GLUCOSE BLD-MCNC: 123 MG/DL (ref 65–99)
HCT VFR BLD AUTO: 22.1 % (ref 34–46.6)
HCT VFR BLD AUTO: 23.4 % (ref 34–46.6)
HCT VFR BLD AUTO: 24.5 % (ref 34–46.6)
HCT VFR BLD AUTO: 25.8 % (ref 34–46.6)
HEMOCCULT STL QL: NEGATIVE
HGB BLD-MCNC: 7.4 G/DL (ref 12–15.9)
HGB BLD-MCNC: 8 G/DL (ref 12–15.9)
HGB BLD-MCNC: 8.1 G/DL (ref 12–15.9)
HGB BLD-MCNC: 8.6 G/DL (ref 12–15.9)
IMM GRANULOCYTES # BLD AUTO: 0 10*3/MM3 (ref 0–0.05)
IMM GRANULOCYTES # BLD AUTO: 0 10*3/MM3 (ref 0–0.05)
IMM GRANULOCYTES # BLD AUTO: 0.01 10*3/MM3 (ref 0–0.05)
IMM GRANULOCYTES NFR BLD AUTO: 0 % (ref 0–0.5)
IMM GRANULOCYTES NFR BLD AUTO: 0 % (ref 0–0.5)
IMM GRANULOCYTES NFR BLD AUTO: 0.3 % (ref 0–0.5)
INR PPP: 1.24 (ref 0.85–1.16)
IRON 24H UR-MRATE: 43 MCG/DL (ref 37–145)
IRON SATN MFR SERPL: 22 % (ref 20–50)
LDH SERPL-CCNC: 214 U/L (ref 135–214)
LEFT ATRIUM VOLUME INDEX: 59.8 ML/M^2
LEFT ATRIUM VOLUME: 108 ML
LIPASE SERPL-CCNC: 35 U/L (ref 13–60)
LV EF 2D ECHO EST: 62 %
LYMPHOCYTES # BLD AUTO: 0.66 10*3/MM3 (ref 0.7–3.1)
LYMPHOCYTES # BLD AUTO: 0.71 10*3/MM3 (ref 0.7–3.1)
LYMPHOCYTES # BLD AUTO: 0.74 10*3/MM3 (ref 0.7–3.1)
LYMPHOCYTES NFR BLD AUTO: 19.1 % (ref 19.6–45.3)
LYMPHOCYTES NFR BLD AUTO: 29.6 % (ref 19.6–45.3)
LYMPHOCYTES NFR BLD AUTO: 30.1 % (ref 19.6–45.3)
MAGNESIUM SERPL-MCNC: 1.5 MG/DL (ref 1.6–2.6)
MAXIMAL PREDICTED HEART RATE: 192 BPM
MCH RBC QN AUTO: 28.8 PG (ref 26.6–33)
MCH RBC QN AUTO: 28.9 PG (ref 26.6–33)
MCH RBC QN AUTO: 28.9 PG (ref 26.6–33)
MCHC RBC AUTO-ENTMCNC: 33.1 G/DL (ref 31.5–35.7)
MCHC RBC AUTO-ENTMCNC: 33.5 G/DL (ref 31.5–35.7)
MCHC RBC AUTO-ENTMCNC: 34.2 G/DL (ref 31.5–35.7)
MCV RBC AUTO: 84.2 FL (ref 79–97)
MCV RBC AUTO: 86.3 FL (ref 79–97)
MCV RBC AUTO: 87.5 FL (ref 79–97)
MONOCYTES # BLD AUTO: 0.18 10*3/MM3 (ref 0.1–0.9)
MONOCYTES # BLD AUTO: 0.21 10*3/MM3 (ref 0.1–0.9)
MONOCYTES # BLD AUTO: 0.33 10*3/MM3 (ref 0.1–0.9)
MONOCYTES NFR BLD AUTO: 7.3 % (ref 5–12)
MONOCYTES NFR BLD AUTO: 8.8 % (ref 5–12)
MONOCYTES NFR BLD AUTO: 9.6 % (ref 5–12)
NEUTROPHILS # BLD AUTO: 1.35 10*3/MM3 (ref 1.7–7)
NEUTROPHILS # BLD AUTO: 1.46 10*3/MM3 (ref 1.7–7)
NEUTROPHILS # BLD AUTO: 2.38 10*3/MM3 (ref 1.7–7)
NEUTROPHILS NFR BLD AUTO: 56.2 % (ref 42.7–76)
NEUTROPHILS NFR BLD AUTO: 59.4 % (ref 42.7–76)
NEUTROPHILS NFR BLD AUTO: 69 % (ref 42.7–76)
PATH INTERP BLD-IMP: NORMAL
PHOSPHATE SERPL-MCNC: 4.7 MG/DL (ref 2.5–4.5)
PLATELET # BLD AUTO: 35 10*3/MM3 (ref 140–450)
PLATELET # BLD AUTO: 37 10*3/MM3 (ref 140–450)
PLATELET # BLD AUTO: 43 10*3/MM3 (ref 140–450)
PMV BLD AUTO: 10.1 FL (ref 6–12)
PMV BLD AUTO: 10.5 FL (ref 6–12)
PMV BLD AUTO: 10.6 FL (ref 6–12)
POTASSIUM BLD-SCNC: 4.3 MMOL/L (ref 3.5–5.2)
POTASSIUM BLD-SCNC: 4.5 MMOL/L (ref 3.5–5.2)
PROCALCITONIN SERPL-MCNC: 0.38 NG/ML (ref 0.1–0.25)
PROT SERPL-MCNC: 4.9 G/DL (ref 6–8.5)
PROT SERPL-MCNC: 5.8 G/DL (ref 6–8.5)
PROTHROMBIN TIME: 15 SECONDS (ref 11.2–14.3)
PTH-INTACT SERPL-MCNC: 70.8 PG/ML (ref 15–65)
RBC # BLD AUTO: 2.56 10*6/MM3 (ref 3.77–5.28)
RBC # BLD AUTO: 2.78 10*6/MM3 (ref 3.77–5.28)
RBC # BLD AUTO: 2.8 10*6/MM3 (ref 3.77–5.28)
RBC MORPH BLD: NORMAL
RETICS # AUTO: 0.11 10*6/MM3 (ref 0.02–0.13)
RETICS/RBC NFR AUTO: 4.17 % (ref 0.7–1.9)
RH BLD: POSITIVE
SMALL PLATELETS BLD QL SMEAR: NORMAL
SODIUM BLD-SCNC: 141 MMOL/L (ref 136–145)
SODIUM BLD-SCNC: 143 MMOL/L (ref 136–145)
STRESS TARGET HR: 163 BPM
T&S EXPIRATION DATE: NORMAL
TIBC SERPL-MCNC: 197 MCG/DL (ref 298–536)
TRANSFERRIN SERPL-MCNC: 132 MG/DL (ref 200–360)
VIT B12 BLD-MCNC: 698 PG/ML (ref 211–946)
WBC MORPH BLD: NORMAL
WBC NRBC COR # BLD: 2.4 10*3/MM3 (ref 3.4–10.8)
WBC NRBC COR # BLD: 2.46 10*3/MM3 (ref 3.4–10.8)
WBC NRBC COR # BLD: 3.45 10*3/MM3 (ref 3.4–10.8)

## 2019-05-11 PROCEDURE — 93306 TTE W/DOPPLER COMPLETE: CPT | Performed by: INTERNAL MEDICINE

## 2019-05-11 PROCEDURE — 70450 CT HEAD/BRAIN W/O DYE: CPT

## 2019-05-11 PROCEDURE — 93971 EXTREMITY STUDY: CPT | Performed by: INTERNAL MEDICINE

## 2019-05-11 PROCEDURE — 85025 COMPLETE CBC W/AUTO DIFF WBC: CPT | Performed by: EMERGENCY MEDICINE

## 2019-05-11 PROCEDURE — 71250 CT THORAX DX C-: CPT

## 2019-05-11 PROCEDURE — 25010000002 MAGNESIUM SULFATE IN D5W 1G/100ML (PREMIX) 1-5 GM/100ML-% SOLUTION: Performed by: INTERNAL MEDICINE

## 2019-05-11 PROCEDURE — 82728 ASSAY OF FERRITIN: CPT | Performed by: NURSE PRACTITIONER

## 2019-05-11 PROCEDURE — 86900 BLOOD TYPING SEROLOGIC ABO: CPT | Performed by: NURSE PRACTITIONER

## 2019-05-11 PROCEDURE — 85379 FIBRIN DEGRADATION QUANT: CPT | Performed by: INTERNAL MEDICINE

## 2019-05-11 PROCEDURE — 82607 VITAMIN B-12: CPT | Performed by: NURSE PRACTITIONER

## 2019-05-11 PROCEDURE — 84145 PROCALCITONIN (PCT): CPT | Performed by: EMERGENCY MEDICINE

## 2019-05-11 PROCEDURE — 80053 COMPREHEN METABOLIC PANEL: CPT | Performed by: EMERGENCY MEDICINE

## 2019-05-11 PROCEDURE — 82272 OCCULT BLD FECES 1-3 TESTS: CPT | Performed by: NURSE PRACTITIONER

## 2019-05-11 PROCEDURE — 25010000002 PIPERACILLIN SOD-TAZOBACTAM PER 1 G: Performed by: EMERGENCY MEDICINE

## 2019-05-11 PROCEDURE — 85014 HEMATOCRIT: CPT | Performed by: INTERNAL MEDICINE

## 2019-05-11 PROCEDURE — 99222 1ST HOSP IP/OBS MODERATE 55: CPT | Performed by: INTERNAL MEDICINE

## 2019-05-11 PROCEDURE — 74176 CT ABD & PELVIS W/O CONTRAST: CPT

## 2019-05-11 PROCEDURE — 25010000002 DIPHENHYDRAMINE PER 50 MG: Performed by: EMERGENCY MEDICINE

## 2019-05-11 PROCEDURE — 85060 BLOOD SMEAR INTERPRETATION: CPT | Performed by: INTERNAL MEDICINE

## 2019-05-11 PROCEDURE — 87076 CULTURE ANAEROBE IDENT EACH: CPT | Performed by: EMERGENCY MEDICINE

## 2019-05-11 PROCEDURE — 25010000002 VANCOMYCIN 10 G RECONSTITUTED SOLUTION

## 2019-05-11 PROCEDURE — 85025 COMPLETE CBC W/AUTO DIFF WBC: CPT | Performed by: INTERNAL MEDICINE

## 2019-05-11 PROCEDURE — 93306 TTE W/DOPPLER COMPLETE: CPT

## 2019-05-11 PROCEDURE — 85610 PROTHROMBIN TIME: CPT | Performed by: NURSE PRACTITIONER

## 2019-05-11 PROCEDURE — 82306 VITAMIN D 25 HYDROXY: CPT | Performed by: INTERNAL MEDICINE

## 2019-05-11 PROCEDURE — 84100 ASSAY OF PHOSPHORUS: CPT | Performed by: INTERNAL MEDICINE

## 2019-05-11 PROCEDURE — 83615 LACTATE (LD) (LDH) ENZYME: CPT | Performed by: INTERNAL MEDICINE

## 2019-05-11 PROCEDURE — 85045 AUTOMATED RETICULOCYTE COUNT: CPT | Performed by: NURSE PRACTITIONER

## 2019-05-11 PROCEDURE — 86901 BLOOD TYPING SEROLOGIC RH(D): CPT | Performed by: NURSE PRACTITIONER

## 2019-05-11 PROCEDURE — 86850 RBC ANTIBODY SCREEN: CPT | Performed by: NURSE PRACTITIONER

## 2019-05-11 PROCEDURE — 99223 1ST HOSP IP/OBS HIGH 75: CPT | Performed by: INTERNAL MEDICINE

## 2019-05-11 PROCEDURE — 83690 ASSAY OF LIPASE: CPT | Performed by: EMERGENCY MEDICINE

## 2019-05-11 PROCEDURE — 99406 BEHAV CHNG SMOKING 3-10 MIN: CPT | Performed by: INTERNAL MEDICINE

## 2019-05-11 PROCEDURE — 93971 EXTREMITY STUDY: CPT

## 2019-05-11 PROCEDURE — 83540 ASSAY OF IRON: CPT | Performed by: NURSE PRACTITIONER

## 2019-05-11 PROCEDURE — 82330 ASSAY OF CALCIUM: CPT | Performed by: INTERNAL MEDICINE

## 2019-05-11 PROCEDURE — 85730 THROMBOPLASTIN TIME PARTIAL: CPT | Performed by: INTERNAL MEDICINE

## 2019-05-11 PROCEDURE — 85018 HEMOGLOBIN: CPT | Performed by: INTERNAL MEDICINE

## 2019-05-11 PROCEDURE — 85007 BL SMEAR W/DIFF WBC COUNT: CPT | Performed by: EMERGENCY MEDICINE

## 2019-05-11 PROCEDURE — 83970 ASSAY OF PARATHORMONE: CPT | Performed by: INTERNAL MEDICINE

## 2019-05-11 PROCEDURE — 25010000002 PIPERACILLIN SOD-TAZOBACTAM PER 1 G: Performed by: NURSE PRACTITIONER

## 2019-05-11 PROCEDURE — 82746 ASSAY OF FOLIC ACID SERUM: CPT | Performed by: NURSE PRACTITIONER

## 2019-05-11 PROCEDURE — 87040 BLOOD CULTURE FOR BACTERIA: CPT | Performed by: EMERGENCY MEDICINE

## 2019-05-11 PROCEDURE — 80053 COMPREHEN METABOLIC PANEL: CPT | Performed by: NURSE PRACTITIONER

## 2019-05-11 PROCEDURE — 83605 ASSAY OF LACTIC ACID: CPT | Performed by: EMERGENCY MEDICINE

## 2019-05-11 PROCEDURE — 71045 X-RAY EXAM CHEST 1 VIEW: CPT

## 2019-05-11 PROCEDURE — 85025 COMPLETE CBC W/AUTO DIFF WBC: CPT | Performed by: NURSE PRACTITIONER

## 2019-05-11 PROCEDURE — 84466 ASSAY OF TRANSFERRIN: CPT | Performed by: NURSE PRACTITIONER

## 2019-05-11 PROCEDURE — 25010000002 VANCOMYCIN PER 500 MG

## 2019-05-11 PROCEDURE — 83735 ASSAY OF MAGNESIUM: CPT | Performed by: INTERNAL MEDICINE

## 2019-05-11 RX ORDER — MAGNESIUM SULFATE HEPTAHYDRATE 40 MG/ML
2 INJECTION, SOLUTION INTRAVENOUS AS NEEDED
Status: DISCONTINUED | OUTPATIENT
Start: 2019-05-11 | End: 2019-05-12 | Stop reason: HOSPADM

## 2019-05-11 RX ORDER — PANTOPRAZOLE SODIUM 40 MG/10ML
40 INJECTION, POWDER, LYOPHILIZED, FOR SOLUTION INTRAVENOUS EVERY 12 HOURS SCHEDULED
Status: DISCONTINUED | OUTPATIENT
Start: 2019-05-11 | End: 2019-05-12

## 2019-05-11 RX ORDER — HYDROXYZINE HYDROCHLORIDE 25 MG/1
25 TABLET, FILM COATED ORAL ONCE
Status: COMPLETED | OUTPATIENT
Start: 2019-05-11 | End: 2019-05-11

## 2019-05-11 RX ORDER — ACETAMINOPHEN 325 MG/1
650 TABLET ORAL EVERY 4 HOURS PRN
Status: DISCONTINUED | OUTPATIENT
Start: 2019-05-11 | End: 2019-05-12 | Stop reason: HOSPADM

## 2019-05-11 RX ORDER — MAGNESIUM SULFATE HEPTAHYDRATE 40 MG/ML
4 INJECTION, SOLUTION INTRAVENOUS AS NEEDED
Status: DISCONTINUED | OUTPATIENT
Start: 2019-05-11 | End: 2019-05-12 | Stop reason: HOSPADM

## 2019-05-11 RX ORDER — VANCOMYCIN HYDROCHLORIDE 1 G/200ML
12 INJECTION, SOLUTION INTRAVENOUS EVERY 8 HOURS
Status: DISCONTINUED | OUTPATIENT
Start: 2019-05-11 | End: 2019-05-12

## 2019-05-11 RX ORDER — SODIUM CHLORIDE 9 MG/ML
50 INJECTION, SOLUTION INTRAVENOUS ONCE
Status: COMPLETED | OUTPATIENT
Start: 2019-05-11 | End: 2019-05-11

## 2019-05-11 RX ORDER — LORAZEPAM 0.5 MG/1
0.5 TABLET ORAL EVERY 8 HOURS PRN
Status: DISCONTINUED | OUTPATIENT
Start: 2019-05-11 | End: 2019-05-12 | Stop reason: HOSPADM

## 2019-05-11 RX ORDER — ONDANSETRON 2 MG/ML
4 INJECTION INTRAMUSCULAR; INTRAVENOUS EVERY 6 HOURS PRN
Status: DISCONTINUED | OUTPATIENT
Start: 2019-05-11 | End: 2019-05-12 | Stop reason: HOSPADM

## 2019-05-11 RX ORDER — MAGNESIUM SULFATE 1 G/100ML
1 INJECTION INTRAVENOUS AS NEEDED
Status: DISCONTINUED | OUTPATIENT
Start: 2019-05-11 | End: 2019-05-12 | Stop reason: HOSPADM

## 2019-05-11 RX ORDER — OXYCODONE HYDROCHLORIDE AND ACETAMINOPHEN 5; 325 MG/1; MG/1
1 TABLET ORAL EVERY 6 HOURS PRN
Status: DISCONTINUED | OUTPATIENT
Start: 2019-05-11 | End: 2019-05-12 | Stop reason: HOSPADM

## 2019-05-11 RX ORDER — NICOTINE 21 MG/24HR
1 PATCH, TRANSDERMAL 24 HOURS TRANSDERMAL
Status: DISCONTINUED | OUTPATIENT
Start: 2019-05-11 | End: 2019-05-12 | Stop reason: HOSPADM

## 2019-05-11 RX ORDER — DIPHENHYDRAMINE HYDROCHLORIDE 50 MG/ML
25 INJECTION INTRAMUSCULAR; INTRAVENOUS ONCE
Status: COMPLETED | OUTPATIENT
Start: 2019-05-11 | End: 2019-05-11

## 2019-05-11 RX ORDER — SODIUM CHLORIDE 9 MG/ML
75 INJECTION, SOLUTION INTRAVENOUS CONTINUOUS
Status: DISCONTINUED | OUTPATIENT
Start: 2019-05-11 | End: 2019-05-11

## 2019-05-11 RX ADMIN — PANTOPRAZOLE SODIUM 40 MG: 40 INJECTION, POWDER, FOR SOLUTION INTRAVENOUS at 21:42

## 2019-05-11 RX ADMIN — HYDROXYZINE HYDROCHLORIDE 25 MG: 25 TABLET ORAL at 01:19

## 2019-05-11 RX ADMIN — LORAZEPAM 0.5 MG: 0.5 TABLET ORAL at 21:42

## 2019-05-11 RX ADMIN — VANCOMYCIN HYDROCHLORIDE 1000 MG: 1 INJECTION, SOLUTION INTRAVENOUS at 10:35

## 2019-05-11 RX ADMIN — VANCOMYCIN HYDROCHLORIDE 2000 MG: 10 INJECTION, POWDER, LYOPHILIZED, FOR SOLUTION INTRAVENOUS at 01:09

## 2019-05-11 RX ADMIN — TAZOBACTAM SODIUM AND PIPERACILLIN SODIUM 3.38 G: 375; 3 INJECTION, SOLUTION INTRAVENOUS at 14:22

## 2019-05-11 RX ADMIN — MAGNESIUM SULFATE HEPTAHYDRATE 1 G: 1 INJECTION, SOLUTION INTRAVENOUS at 16:37

## 2019-05-11 RX ADMIN — MAGNESIUM SULFATE HEPTAHYDRATE 1 G: 1 INJECTION, SOLUTION INTRAVENOUS at 10:35

## 2019-05-11 RX ADMIN — SODIUM CHLORIDE 1000 ML: 9 INJECTION, SOLUTION INTRAVENOUS at 00:25

## 2019-05-11 RX ADMIN — TAZOBACTAM SODIUM AND PIPERACILLIN SODIUM 3.38 G: 375; 3 INJECTION, SOLUTION INTRAVENOUS at 21:42

## 2019-05-11 RX ADMIN — SODIUM CHLORIDE 50 ML/HR: 9 INJECTION, SOLUTION INTRAVENOUS at 03:54

## 2019-05-11 RX ADMIN — TAZOBACTAM SODIUM AND PIPERACILLIN SODIUM 3.38 G: 375; 3 INJECTION, SOLUTION INTRAVENOUS at 06:59

## 2019-05-11 RX ADMIN — MAGNESIUM SULFATE HEPTAHYDRATE 1 G: 1 INJECTION, SOLUTION INTRAVENOUS at 15:24

## 2019-05-11 RX ADMIN — DIPHENHYDRAMINE HYDROCHLORIDE 25 MG: 50 INJECTION INTRAMUSCULAR; INTRAVENOUS at 02:24

## 2019-05-11 RX ADMIN — TAZOBACTAM SODIUM AND PIPERACILLIN SODIUM 3.38 G: 375; 3 INJECTION, SOLUTION INTRAVENOUS at 00:25

## 2019-05-11 RX ADMIN — VANCOMYCIN HYDROCHLORIDE 1000 MG: 1 INJECTION, SOLUTION INTRAVENOUS at 17:46

## 2019-05-11 RX ADMIN — NICOTINE 1 PATCH: 21 PATCH, EXTENDED RELEASE TRANSDERMAL at 10:34

## 2019-05-11 RX ADMIN — PANTOPRAZOLE SODIUM 40 MG: 40 INJECTION, POWDER, FOR SOLUTION INTRAVENOUS at 10:34

## 2019-05-11 NOTE — PROGRESS NOTES
"Pharmacokinetic Consult - Vancomycin Dosing  Lindsey Aldridge is a 28 y.o. female who has been consulted for vancomycin dosing for sepsis  (goal trough 15-20 mcg/mL).  Ht - 160 cm (63\")  Wt - 77.1 kg (170 lb)    Current Antimicrobial Therapy  Zosyn 3.375 gm IV q8h (day 1 - started 5/11)  Vancomycin 1000 mg IV q8h (day 1 - started 5/11)    Allergies  Aspirin    Microbiology and Radiology  Microbiology Results (last 10 days)       Procedure Component Value - Date/Time    Influenza Antigen, Rapid - Swab, Nasopharynx [[id number]]  (Normal) Collected:  05/10/19 1509    Lab Status:  Final result Specimen:  Swab from Nasopharynx Updated:  05/11/19 0024     Influenza A Ag, EIA Negative     Influenza B Ag, EIA Negative          Relevant clinical data and objective history reviewed:  Results from last 7 days   Lab Units 05/11/19  0024   BUN mg/dL 17   CREATININE mg/dL 0.79    Estimated Creatinine Clearance: 104.3 mL/min (by C-G formula based on SCr of 0.79 mg/dL).   Intake & Output (last 3 days)         05/08 0701 - 05/09 0700 05/09 0701 - 05/10 0700 05/10 0701 - 05/11 0700    IV Piggyback   1050    Total Intake(mL/kg)   1050 (13.6)    Net   +1050                 Asessment/Plan  1. Will give vancomycin 2000 mg IV once (given in ED 5/11 at 0109)                                                followed by      Vancomycin 1000 mg IV q 8 hours    2. Vancomycin trough is scheduled 5/12 at 0930 prior to the 5th dose    Da Cisneros, PharmD, BCPS  5/11/2019  3:56 AM   "

## 2019-05-11 NOTE — PROGRESS NOTES
"2nd visit today. Admitted earlier by partner.    27 yo f w/ hx of biliary atresia (s/p surgery at [facility] as child), cirrhosis (follows w/ gi at ), hepatitis c, ongoing ivdu (last used heroin day of presentation to ER), anemia, and tobacco abuse. Apparently \"should be on the liver transplant list\" but isn't due to drug abuse. Patient reports sobriety a few months ago, but has now relapsed for past few months injecting heroin, typically into left hand. Denies etoh or benzodiazepine use. Denies n/v, describes normal colored stools (no melena or blood in stool). Has chronic mild abdominal distension and mild chronic abdominal pain which is essentially unchanged but has noted BLE edema over last couple weeks   Presented with ~2 days subjective fever, chills, myalgias, left hand swelling & pain w/ redness to left hand, BLE leg edema. + d-dimer. Left hand xray negative for fracture, procalcitonin elevated, mild hyperbilirubinemia, and elevated alk phos, low magnesium. Admitted with sepsis (fevers at home, elevated procalcitonin), left hand cellulitis, pancytopenia w/ anemia, abdominal distension & LE edema.          Assessment:  *sepsis, poa (fever at home, sweats, elevated procalcitonin, left hand cellulitis)  *left hand cellulitis (improved)    -xray negative fracture 5/11/19  *LUE edema  *ongoing ivdu (iv heroin, last used day of admission)  *hx cirrhosis & splenomegally,hx  biliary atresia (s/p surgical intervention as child at [facility]), hepatitis C cirrhosis    -follows w/ GI at    -states s/p evaluation for liver transplant, but no on list due to ongoing ivdu  *pancytopenia w/ worsening anemia  *abdominal distension & LE edema  *hypomagnesemia  ----------------------------------------------------------------  Plan:  Stop iv fluids (normal bp, low albumin, edema), use albumin prn  Empiric zosyn & vanc, ID consulted; follow blood cultures  -replace mag  Echo  CT chest/abd/pelvis  LUE duplex " (edema)  q6 H&H, transfuse <7 (call if <6.5); guaic stool, d-dimer  Bid ppi til r/o bleeding  Hematology consult (pancytopenia & low plts, likely due to cirrhosis vs infection)  -left hand looks ok, do not anticipate hand surgeon at this point  Noon cbc  Low dose oxycodone (hand pain), prn low dose ativan, zofran to assist w/ w/d symptoms  -social work consult  Requesting previous [facility] records    A.m. Labs cbc, cmp, a1c, hiv, hep panel    Hold dvt proph due to anemia/low plts    -possible GI consult depending on abdominal imaging, hgb, guaic, etc    *discussed wandering policy and that not allowed to leave floor and if do, hospital policy is that patient has left a.m.a, also described severity of illness and if leaves hospital there is significant risk of worsening condition that could include death. She voices understanding

## 2019-05-11 NOTE — PLAN OF CARE
Problem: Patient Care Overview  Goal: Plan of Care Review  Outcome: Ongoing (interventions implemented as appropriate)   05/11/19 6830   Coping/Psychosocial   Plan of Care Reviewed With patient;family   Plan of Care Review   Progress no change   OTHER   Outcome Summary Slept intermittent. Oriented when awake. Intermittent anxiety regarding inability to sleep due to frequent interruptions. Threatened to leave if we did not let her sleep. Care clustered when able. Educated on necessity of tests, lab draws, and medications.  Up to bathroom frequent and sets off bed alarm instead of using call button.  She also unhooked her IV antibiotic while in bathroom.  Educated on both fall and infection risk.  Magnesium replaced.  Labwork abnormal but no significant change this shift.  Swelling abdomen, BLE, and left arm.  C/o mild abdominal discomfort.  Friend/family bedside for visit.

## 2019-05-11 NOTE — CONSULTS
Subjective     PROBLEM LIST:  Patient Active Problem List   Diagnosis   • Polysubstance abuse (CMS/HCC)   • Thrombocytopenia (CMS/HCC)   • Normocytic anemia   • Hepatitis C   • Sepsis (CMS/HCC)   • Biliary atresia   • Splenic infarction history    • Liver failure (CMS/HCC)         CHIEF COMPLAINT: pancytopenia    HISTORY OF PRESENT ILLNESS:  The patient is a 28 y.o. female, referred  for evaluation of low blood counts.  She has a history notable for biliary atresia, hepatitis C, cirrhosis, and polysubstance abuse.  She presented to the emergency room last night with complaints of myalgias, fevers and chills, and shortness of breath for the last 4 days.  She is currently using IV needles to inject heroin and fentanyl.    Currently she complains of feeling very sick and tired and is worn out from all of the testing she's had since admission.  She says she has not had an upper endoscopy as an adult, and has not had any definite GI bleeding that she is aware of.   Per the chart she has required blood transfusions in the past.  She says her current symptoms are just like what she has had with prior blood infections.    REVIEW OF SYSTEMS:  A 14 point review of systems was performed and is negative except as noted above.    Past Medical History:   Diagnosis Date   • Biliary atresia    • Hepatitis C    • Liver failure (CMS/HCC)    • Renal disorder    • Splenic artery injury    • Splenic infarction        No current facility-administered medications on file prior to encounter.      Current Outpatient Medications on File Prior to Encounter   Medication Sig Dispense Refill   • HYDROXYZINE HCL PO Take  by mouth.     • lamoTRIgine (LAMICTAL PO) Take  by mouth.         Allergies   Allergen Reactions   • Aspirin Other (See Comments)     DETERIOTES BILE DUCTS       Past Surgical History:   Procedure Laterality Date   •  SECTION             Social History     Socioeconomic History   • Marital status: Single     Spouse  name: Not on file   • Number of children: Not on file   • Years of education: Not on file   • Highest education level: Not on file   Tobacco Use   • Smoking status: Current Every Day Smoker     Packs/day: 1.00     Types: Cigarettes   • Smokeless tobacco: Never Used   Substance and Sexual Activity   • Alcohol use: Yes     Comment: OCCASIONALLY   • Drug use: Yes     Types: IV, Benzodiazepines     Comment: HEROIN AND FENTANYL - LAST USE 5/10/19 2300   • Sexual activity: Defer       Family History   Problem Relation Age of Onset   • Heart disease Father        Objective     Vitals:    05/11/19 0145 05/11/19 0200 05/11/19 0320 05/11/19 0822   BP:  131/88 133/80 139/90   BP Location:   Right arm Right arm   Patient Position:   Lying Sitting   Pulse: 108 109 98 96   Resp:   20 18   Temp:   98.6 °F (37 °C) 97.5 °F (36.4 °C)   TempSrc:   Oral Oral   SpO2: 97% 95% 100% 100%   Weight:       Height:           Performance Status: 3  General:  female, tearful, appears uncomfortable  Neuro: alert and oriented  HEENT: sclerae anicteric, oropharynx clear.  Some swelling of right eyelid  Lymphatics: no cervical, supraclavicular, or axillary adenopathy  Cardiovascular: tachycardic, regular  Lungs: clear to auscultation bilaterally  Abdomen: soft, nontender, nondistended. ++ splenomegaly  Extremities: no lower extremity edema  Skin: no rashes, lesions, bruising, or petechiae  Psych: tearful      Results for DEYANIRA EASON (MRN [id number]) as of 5/11/2019 09:15   Ref. Range 5/11/2019 05:01   Iron Latest Ref Range: 37 - 145 mcg/dL 43   Ferritin Latest Ref Range: 13.00 - 150.00 ng/mL 88.10   Iron Saturation Latest Ref Range: 20 - 50 % 22   Transferrin Latest Ref Range: 200 - 360 mg/dL 132 (L)   TIBC Latest Ref Range: 298 - 536 mcg/dL 197 (L)   Results for DEYANIRA EASON (MRN [id number]) as of 5/11/2019 09:15   Ref. Range 5/11/2019 05:01   WBC Latest Ref Range: 3.40 - 10.80 10*3/mm3 2.46 (L)   RBC Latest Ref Range: 3.77 -  5.28 10*6/mm3 2.56 (L)   Hemoglobin Latest Ref Range: 12.0 - 15.9 g/dL 7.4 (L)   Hematocrit Latest Ref Range: 34.0 - 46.6 % 22.1 (L)   RDW Latest Ref Range: 12.3 - 15.4 % 16.5 (H)   MCV Latest Ref Range: 79.0 - 97.0 fL 86.3   MCH Latest Ref Range: 26.6 - 33.0 pg 28.9   MCHC Latest Ref Range: 31.5 - 35.7 g/dL 33.5   MPV Latest Ref Range: 6.0 - 12.0 fL 10.6   Platelets Latest Ref Range: 140 - 450 10*3/mm3 35 (C)   RDW-SD Latest Ref Range: 37.0 - 54.0 fl 52.1   Results for LINDSEY EASON (MRN [id number]) as of 5/11/2019 09:15   Ref. Range 5/11/2019 05:01   Reticulocyte % Latest Ref Range: 0.70 - 1.90 % 4.17 (H)     Imaging:     IMPRESSION:  Normal, negative unenhanced head CT.     I personally reviewed the CT images of the chest abdomen and pelvis.  She has massive splenomegaly.    Assessment/Plan     Lindsey Eason is a 28 y.o. year old female with a history of polysubstance abuse, cirrhosis, and hepatitis C who has pancytopenia in the setting of probable infection.    Pancytopenia: She has a history of liver disease, portal hypertension and splenomegaly which likely contributes to her chronic cytopenias.  The only available prior blood work that we have is from about a year ago which showed a platelet count in the 50s, hemoglobin of 8, and a white count of 3.9.  In the setting of infection these numbers may be worsening due to acute inflammatory response.  There is no evidence of iron deficiency.  I will order vitamin B12 and folate.  GI blood loss should be considered in the setting of portal hypertension and possible variceal bleeds.    She has elevated PT and PTT as well as an elevated d-dimer.  These can be attributed to chronic liver disease, or can also be seen in the setting of DIC.    Endocarditis has been considered and blood cultures are pending.  She is unfortunately very high risk for this condition and this could certainly explain the changes in her blood counts.    For now recommend  monitoring of counts.           Susan Denny MD    5/11/2019

## 2019-05-11 NOTE — ED PROVIDER NOTES
Subjective   Ms. Lindsey Aldridge is a 28 y.o. female who presents to the ED with c/o illness. She reports she thinks she has another blood infection. 4 days ago she developed myalgias, fever, scabs, diaphoresis, chills, fatigue, difficulty urinating, and abscess. She denies cough. She has chronic shortness of breath. She is an IV drug abuser. She states she is not sure if she is ill nor if she is experiencing withdrawal but using drugs does not alleviate her symptoms. She states she has been using unclean needles and has not gone to a needle exchange recently. She was admitted [facility]  with sepsis 6 months ago. She had MRSA 4 years ago. She has a history of aneurysm. She denies a history of PE/DVT. She also has a history of biliary atresia. She was kicked out of a treatment facility 2 months ago and has wishes to be referred to a new facility. She has not had a menstrual period in 3 years. There are no other acute complaints at this time.        History provided by:  Patient  Illness   Severity:  Moderate  Onset quality:  Sudden  Duration:  4 days  Timing:  Constant  Progression:  Waxing and waning  Chronicity:  Recurrent  Context:  IV drug abuse  Relieved by:  None tried  Ineffective treatments:  None tried  Associated symptoms: fatigue, fever, myalgias and shortness of breath    Associated symptoms: no cough        Review of Systems   Constitutional: Positive for chills, diaphoresis, fatigue and fever.   Respiratory: Positive for shortness of breath. Negative for cough.    Genitourinary: Positive for difficulty urinating.   Musculoskeletal: Positive for myalgias.   Skin: Positive for wound (scabs, abscess to left hand).   All other systems reviewed and are negative.      Past Medical History:   Diagnosis Date   • Biliary atresia    • Hepatitis C    • Liver failure (CMS/HCC)    • Renal disorder    • Splenic artery injury    • Splenic infarction        Allergies   Allergen Reactions   • Aspirin Other (See  Comments)     DETERIOTES BILE DUCTS       Past Surgical History:   Procedure Laterality Date   •  SECTION         History reviewed. No pertinent family history.    Social History     Socioeconomic History   • Marital status: Single     Spouse name: Not on file   • Number of children: Not on file   • Years of education: Not on file   • Highest education level: Not on file   Tobacco Use   • Smoking status: Current Every Day Smoker     Packs/day: 1.00     Types: Cigarettes   • Smokeless tobacco: Never Used   Substance and Sexual Activity   • Alcohol use: Yes     Comment: OCCASIONALLY   • Drug use: Yes     Types: IV     Comment: HEROIN AND FENTANYL - LAST USE 5/10/19 2300   • Sexual activity: Defer         Objective   Physical Exam   Constitutional: She is oriented to person, place, and time. She appears well-developed and well-nourished.   Mildly warm to the touch.   HENT:   Head: Normocephalic and atraumatic.   Nose: Nose normal.   Eyes: Conjunctivae are normal. No scleral icterus.   Neck: Normal range of motion. Neck supple.   Cardiovascular: Regular rhythm. Tachycardia present.   Murmur heard.  Murmur auscultating.  3+ edema up to below the knees.  Equal calf size.   Pulmonary/Chest: Effort normal and breath sounds normal. No respiratory distress.   Abdominal: Soft. There is no tenderness.   Musculoskeletal: Normal range of motion.   Neurological: She is alert and oriented to person, place, and time.   Skin: Skin is warm and dry.   Chronic skin color changes to the bottom lower extremities from venostasis.  Redness to the left hand just proximal to the 5th finger from recent IV drug use.   Psychiatric: She has a normal mood and affect. Her behavior is normal.   Nursing note and vitals reviewed.      Procedures         ED Course       Recent Results (from the past 24 hour(s))   Influenza Antigen, Rapid - Swab, Nasopharynx    Collection Time: 05/10/19 11:33 PM   Result Value Ref Range    Influenza A Ag, EIA  Negative Negative    Influenza B Ag, EIA Negative Negative   Urinalysis With Culture If Indicated - Urine, Clean Catch    Collection Time: 05/10/19 11:33 PM   Result Value Ref Range    Color, UA Dark Yellow (A) Yellow, Straw    Appearance, UA Clear Clear    pH, UA 7.0 5.0 - 8.0    Specific Gravity, UA 1.015 1.001 - 1.030    Glucose, UA Negative Negative    Ketones, UA Negative Negative    Bilirubin, UA Small (1+) (A) Negative    Blood, UA Moderate (2+) (A) Negative    Protein, UA >=300 mg/dL (3+) (A) Negative    Leuk Esterase, UA Trace (A) Negative    Nitrite, UA Negative Negative    Urobilinogen, UA 2.0 E.U./dL (A) 0.2 - 1.0 E.U./dL   Urine Drug Screen - Urine, Clean Catch    Collection Time: 05/10/19 11:33 PM   Result Value Ref Range    THC, Screen, Urine Negative Negative    Phencyclidine (PCP), Urine Negative Negative    Cocaine Screen, Urine Negative Negative    Methamphetamine, Ur Negative Negative    Opiate Screen Positive (A) Negative    Amphetamine Screen, Urine Negative Negative    Benzodiazepine Screen, Urine Negative Negative    Tricyclic Antidepressants Screen Negative Negative    Methadone Screen, Urine Negative Negative    Barbiturates Screen, Urine Negative Negative    Oxycodone Screen, Urine Negative Negative    Propoxyphene Screen Negative Negative    Buprenorphine, Screen, Urine Negative Negative   Urinalysis, Microscopic Only - Urine, Clean Catch    Collection Time: 05/10/19 11:33 PM   Result Value Ref Range    RBC, UA 13-20 (A) None Seen, 0-2 /HPF    WBC, UA 3-5 (A) None Seen, 0-2 /HPF    Bacteria, UA None Seen None Seen, Trace /HPF    Squamous Epithelial Cells, UA 0-2 None Seen, 0-2 /HPF    Hyaline Casts, UA 0-6 0 - 6 /LPF    Methodology Automated Microscopy    Comprehensive Metabolic Panel    Collection Time: 05/11/19 12:24 AM   Result Value Ref Range    Glucose 109 (H) 65 - 99 mg/dL    BUN 17 6 - 20 mg/dL    Creatinine 0.79 0.57 - 1.00 mg/dL    Sodium 141 136 - 145 mmol/L    Potassium 4.5 3.5  - 5.2 mmol/L    Chloride 111 (H) 98 - 107 mmol/L    CO2 22.0 22.0 - 29.0 mmol/L    Calcium 8.0 (L) 8.6 - 10.5 mg/dL    Total Protein 5.8 (L) 6.0 - 8.5 g/dL    Albumin 2.10 (L) 3.50 - 5.20 g/dL    ALT (SGPT) 21 1 - 33 U/L    AST (SGOT) 44 (H) 1 - 32 U/L    Alkaline Phosphatase 464 (H) 39 - 117 U/L    Total Bilirubin 1.4 (H) 0.2 - 1.2 mg/dL    eGFR Non African Amer 87 >60 mL/min/1.73    Globulin 3.7 gm/dL    A/G Ratio 0.6 g/dL    BUN/Creatinine Ratio 21.5 7.0 - 25.0    Anion Gap 8.0 mmol/L   Lipase    Collection Time: 05/11/19 12:24 AM   Result Value Ref Range    Lipase 35 13 - 60 U/L   Lactic Acid, Plasma    Collection Time: 05/11/19 12:24 AM   Result Value Ref Range    Lactate 0.8 0.5 - 2.0 mmol/L   Procalcitonin    Collection Time: 05/11/19 12:24 AM   Result Value Ref Range    Procalcitonin 0.38 (H) 0.10 - 0.25 ng/mL   CBC Auto Differential    Collection Time: 05/11/19 12:24 AM   Result Value Ref Range    WBC 3.45 3.40 - 10.80 10*3/mm3    RBC 2.78 (L) 3.77 - 5.28 10*6/mm3    Hemoglobin 8.0 (L) 12.0 - 15.9 g/dL    Hematocrit 23.4 (L) 34.0 - 46.6 %    MCV 84.2 79.0 - 97.0 fL    MCH 28.8 26.6 - 33.0 pg    MCHC 34.2 31.5 - 35.7 g/dL    RDW 16.1 (H) 12.3 - 15.4 %    RDW-SD 49.7 37.0 - 54.0 fl    MPV 10.1 6.0 - 12.0 fL    Platelets 43 (C) 140 - 450 10*3/mm3    Neutrophil % 69.0 42.7 - 76.0 %    Lymphocyte % 19.1 (L) 19.6 - 45.3 %    Monocyte % 9.6 5.0 - 12.0 %    Eosinophil % 2.0 0.3 - 6.2 %    Basophil % 0.0 0.0 - 1.5 %    Immature Grans % 0.3 0.0 - 0.5 %    Neutrophils, Absolute 2.38 1.70 - 7.00 10*3/mm3    Lymphocytes, Absolute 0.66 (L) 0.70 - 3.10 10*3/mm3    Monocytes, Absolute 0.33 0.10 - 0.90 10*3/mm3    Eosinophils, Absolute 0.07 0.00 - 0.40 10*3/mm3    Basophils, Absolute 0.00 0.00 - 0.20 10*3/mm3    Immature Grans, Absolute 0.01 0.00 - 0.05 10*3/mm3   Scan Slide    Collection Time: 05/11/19 12:24 AM   Result Value Ref Range    RBC Morphology Normal Normal    WBC Morphology Normal Normal    Platelet Estimate  "Decreased Normal     Note: In addition to lab results from this visit, the labs listed above may include labs taken at another facility or during a different encounter within the last 24 hours. Please correlate lab times with ED admission and discharge times for further clarification of the services performed during this visit.    CT Head Without Contrast   Final Result   Normal, negative unenhanced head CT.               Signer Name: ITZEL Marte MD    Signed: 5/11/2019 12:57 AM    Workstation Name: [email or web address]          XR Chest 1 View   Final Result   No acute cardiopulmonary findings.      Signer Name: ITZEL Marte MD    Signed: 5/11/2019 12:42 AM    Workstation Name: [email or web address]          XR Hand 3+ View Left   Final Result   Nonspecific dorsal and soft tissue swelling.      Signer Name: ITZEL Marte MD    Signed: 5/11/2019 12:41 AM    Workstation Name: [email or web address]            Vitals:    05/10/19 2239 05/10/19 2336 05/11/19 0030   BP: 142/85  134/80   BP Location: Left arm  Left arm   Patient Position: Sitting  Sitting   Pulse: 111  102   Resp: 20  18   Temp: 99.1 °F (37.3 °C) 100 °F (37.8 °C)    TempSrc: Oral Rectal    SpO2: 100%  98%   Weight: 77.1 kg (170 lb)     Height: 160 cm (63\")       Medications   sodium chloride 0.9 % flush 10 mL (not administered)   vancomycin 2000 mg/500 mL 0.9% NS IVPB ([facility]) (2,000 mg Intravenous New Bag 5/11/19 0109)   sodium chloride 0.9 % bolus 1,000 mL (0 mL Intravenous Stopped 5/11/19 0108)   acetaminophen (TYLENOL) tablet 1,000 mg (1,000 mg Oral Given 5/10/19 2336)   piperacillin-tazobactam (ZOSYN) 3.375 g in iso-osmotic dextrose 50 ml (premix) (0 g Intravenous Stopped 5/11/19 0108)   hydrOXYzine (ATARAX) tablet 25 mg (25 mg Oral Given 5/11/19 0119)     ECG/EMG Results (last 24 hours)     ** No results found for the last 24 hours. **        No orders to display                     MDM  Number of Diagnoses or Management Options  IVDU (intravenous drug user): new " and requires workup  Sepsis, due to unspecified organism (CMS/HCC): new and requires workup  Diagnosis management comments: Patient has IV drug use history.  Admits to using IV drugs prior to presentation.    Vital signs show patient to be tachycardic and have a fever.    I am concerned patient has a murmur and may have underlying endocarditis.    Blood cultures have been drawn, and antibiotics in the form of Zosyn and vancomycin initiated.    Discussed the patient with the hospitalist, Dr. Wynne, who will admit.       Amount and/or Complexity of Data Reviewed  Clinical lab tests: ordered and reviewed  Tests in the radiology section of CPT®: ordered and reviewed  Decide to obtain previous medical records or to obtain history from someone other than the patient: yes  Obtain history from someone other than the patient: yes  Review and summarize past medical records: yes  Discuss the patient with other providers: yes  Independent visualization of images, tracings, or specimens: yes    Patient Progress  Patient progress: stable      Final diagnoses:   Sepsis, due to unspecified organism (CMS/HCC)   IVDU (intravenous drug user)       Documentation assistance provided by liana Guerrero.  Information recorded by the liana was done at my direction and has been verified and validated by me.     Divya Guerrero  05/11/19 0020       Vaishali Dumont MD  05/11/19 0205

## 2019-05-11 NOTE — H&P
"[facility]  HISTORY AND PHYSICAL    Patient Name: Lindsey Aldridge  : 1990  MRN: [id number]  Primary Care Physician: Provider, No Known    Subjective   Subjective     Chief Complaint:  \" I think I have a blood infection\"     HPI:  Lindsey Aldridge is a 28 y.o. female with a past medical history significant for biliary atresia, hepatitis C,  Polysubstance abuse, splenic infarction and tobacco abuse presents to the ED with complaints of \"I think I have a blood infection.\"  Patient reports myalgias, fever, diaphoresis, chills, fatigue, difficulty urinating and shortness of air that has been ongoing for four days.  Patient admits to using IV heroin/fentanyl and at times xanax \"when I can get it\".  Her last use of heroin was PTA tonight.  She admits to not using clean needles.  She was admitted to  approximately 6 months ago due to sepsis.  She has a prior history of MRSA four years ago.  She denies any chest pain, diarrhea, dysuria, palpitations, headache or coughing.  Two months ago she was kicked out of a treatment facility for \"testing dirty.\"  She reports she wants to get better from her addition but states she can't due to her chronic pain. Patient is tearful and reports she wants to get her son back.  Patient will be admitted to [facility] under the care to the Hospitalist for further evaluation and treatment.     Review of Systems   Constitutional: Positive for activity change, chills, diaphoresis, fatigue and fever. Negative for appetite change and unexpected weight change.   HENT: Negative.    Eyes: Negative for photophobia and visual disturbance.   Respiratory: Positive for shortness of breath. Negative for cough.    Cardiovascular: Positive for leg swelling. Negative for chest pain and palpitations.   Gastrointestinal: Positive for abdominal distention and abdominal pain. Negative for blood in stool, constipation, diarrhea, nausea and vomiting. "   Genitourinary: Positive for difficulty urinating. Negative for dysuria, flank pain, frequency and hematuria.   Musculoskeletal: Positive for arthralgias. Negative for neck pain and neck stiffness.   Skin: Negative.    Neurological: Negative.    Psychiatric/Behavioral: Negative.         Otherwise 10-system ROS reviewed and is negative except as mentioned in the HPI.    Personal History     Past Medical History:   Diagnosis Date   • Biliary atresia    • Hepatitis C    • Liver failure (CMS/HCC)    • Renal disorder    • Splenic artery injury    • Splenic infarction        Past Surgical History:   Procedure Laterality Date   •  SECTION         Family History: family history includes Heart disease in her father.     Social History:  reports that she has been smoking cigarettes.  She has been smoking about 1.00 pack per day. She has never used smokeless tobacco. She reports that she drinks alcohol. She reports that she uses drugs. Drugs: IV and Benzodiazepines.    Medications:    (Not in a hospital admission)    Allergies   Allergen Reactions   • Aspirin Other (See Comments)     DETERIOTES BILE DUCTS       Objective   Objective     Vital Signs:   Temp:  [99.1 °F (37.3 °C)-100 °F (37.8 °C)] 100 °F (37.8 °C)  Heart Rate:  [102-111] 109  Resp:  [18-20] 18  BP: (131-142)/(80-88) 131/88        Physical Exam   Constitutional: She is oriented to person, place, and time. She appears well-developed and well-nourished. No distress.   Alert and oriented x4, tearful at times. Mother at bedside.    HENT:   Head: Normocephalic and atraumatic.   Eyes: Conjunctivae and EOM are normal. Pupils are equal, round, and reactive to light. Right eye exhibits no discharge. Left eye exhibits no discharge. No scleral icterus.   Neck: Normal range of motion. Neck supple. No JVD present. No tracheal deviation present. No thyromegaly present.   Cardiovascular: Normal rate, regular rhythm and intact distal pulses. Exam reveals no gallop and no  friction rub.   Murmur heard.  Pulmonary/Chest: Effort normal and breath sounds normal. No stridor. No respiratory distress. She has no wheezes. She has no rales. She exhibits no tenderness.   Abdominal: Soft. Bowel sounds are normal. She exhibits no distension and no mass. There is tenderness. There is no guarding. No hernia.   Generalized tenderness throughout.    Musculoskeletal: Normal range of motion. She exhibits edema. She exhibits no tenderness or deformity.   Moderate edema to bilateral lower extremities.    Lymphadenopathy:     She has no cervical adenopathy.   Neurological: She is alert and oriented to person, place, and time.   Skin: Skin is warm and dry. No rash noted. She is not diaphoretic. There is erythema. No pallor.   Erythema to left wrist. Tender to touch.   -no osler odes noted or splinter hemorrhages    Psychiatric: She has a normal mood and affect. Her behavior is normal. Judgment and thought content normal.   Nursing note and vitals reviewed.       Results Reviewed:  I have personally reviewed current lab, radiology, and data and agree.    Results from last 7 days   Lab Units 05/11/19  0024   WBC 10*3/mm3 3.45   HEMOGLOBIN g/dL 8.0*   HEMATOCRIT % 23.4*   PLATELETS 10*3/mm3 43*     Results from last 7 days   Lab Units 05/11/19  0024   SODIUM mmol/L 141   POTASSIUM mmol/L 4.5   CHLORIDE mmol/L 111*   CO2 mmol/L 22.0   BUN mg/dL 17   CREATININE mg/dL 0.79   GLUCOSE mg/dL 109*   CALCIUM mg/dL 8.0*   ALT (SGPT) U/L 21   AST (SGOT) U/L 44*     No results found for: BNP  No results found for: PHART, PCO2  Imaging Results (last 24 hours)     Procedure Component Value Units Date/Time    CT Head Without Contrast [[id number]] Collected:  05/11/19 0057     Updated:  05/11/19 0059    Narrative:       CT Head WO    HISTORY:   Severe headache for several days.    TECHNIQUE:   Axial unenhanced head CT. Radiation dose reduction techniques included automated exposure control or exposure modulation based on  body size. Count of known CT and cardiac nuc med studies performed in previous 12 months: 0.     Time of scan: 0046 hours    COMPARISON:   3/17/2009    FINDINGS:   No intracranial hemorrhage, mass, or infarct. No hydrocephalus or extra-axial fluid collection. Brain parenchymal density is normal. The skull base, calvarium, and extracranial soft tissues are normal.      Impression:       Normal, negative unenhanced head CT.          Signer Name: ITZEL Marte MD   Signed: 5/11/2019 12:57 AM   Workstation Name: [id number]       XR Chest 1 View [[id number]] Collected:  05/11/19 0042     Updated:  05/11/19 0044    Narrative:       CR Chest 1 Vw    INDICATION:   Fatigue and chills; current smoker.     COMPARISON:    3/23/2018    FINDINGS:  Single portable AP view of the chest.  The heart and mediastinal contours are normal. The lungs are clear. No pneumothorax or pleural effusion.      Impression:       No acute cardiopulmonary findings.    Signer Name: ITZEL Marte MD   Signed: 5/11/2019 12:42 AM   Workstation Name: [email or web address]       XR Hand 3+ View Left [[id number]] Collected:  05/11/19 0041     Updated:  05/11/19 0044    Narrative:       CR Hand Min 3 Vws LT    INDICATION:   Complains of left hand pain. Patient using IV drugs and missed.    COMPARISON:   None available.    FINDINGS:   3 views of the left hand.  No fracture or dislocation.  No bone erosion or destruction.  Mild soft tissue swelling over the dorsum of hand. No radiopaque foreign body. No soft tissue gas.      Impression:       Nonspecific dorsal and soft tissue swelling.    Signer Name: ITZLE Marte MD   Signed: 5/11/2019 12:41 AM   Workstation Name: [email or web address]                Assessment/Plan   Assessment / Plan     Active Hospital Problems    Diagnosis   • Polysubstance abuse (CMS/HCC)   • Thrombocytopenia (CMS/HCC)   • Normocytic anemia   • Hepatitis C   • Sepsis (CMS/HCC)   • Biliary atresia   • Splenic infarction history    • Liver  failure (CMS/[facility])         Assessment & Plan:  28 year old female presents to the ED with a four day history of generalized fatigue, fever, dyspnea, diaphoresis, chills, and myalgias.     1) Sepsis  -, temp 100, procal 0.38  -concerns for possible endocarditis  -echo in am, will likely need SCOTT  -blood cultures pending  -continue vancomycin and zosyn  -IVF  -prn anti-pyretics    2) Polysubstance abuse  -admits to using IV heroin/fentanyl, last use PTA  -states she was clean for 3 years but relapsed, previously on methadone but relapsed  -admits to using xanax when she can afford it.   -consult CM/SS  -counseling provided    3) Normocytic anemia  -will check occult stool  -check anemia studies  -monitor H&H  -type and screen    4) Thrombocytopenia  -etiology not completely clear  -will obtain peripheral smear    5) Hepatitis C  -secondary to #2  -previously followed by hepatology at     6) Biliary atresia  S/[facility] surgery at [facility] as a child  -previously on liver transplant list at  but due to relapsing, patient was removed  -mother at bedside states she has not followed up at  in several months.     7) ? Hand abscess  -xray of left hand shows non-specific dorsal and soft tissue swelling    8) tobacco abuse  -Tobacco Cessation Counseling: 10 minutes of tobacco cessation counseling provided, including but not limited to, risks of ongoing tobacco use, pertinence to current or future health status and availability/examples of cessation resources.  Patient desire to quit.      DVT prophylaxis:scds    CODE STATUS:  Full code   There are no questions and answers to display.       Admission Status:  I believe this patient meets INPATIENT status due to the need for care which can only be reasonably provided in an hospital setting such as possible need for aggressive/expedited ancillary services and/or consultation services, IV medications, close physician monitoring, and/or procedures.  In such, I feel  patient’s risk for adverse outcomes and need for care warrant INPATIENT evaluation and predict the patient’s care encounter to likely last beyond 2 midnights.    Maritza HimanshuJAVIER   05/11/19   3:01 AM      Brief Attending Admission Attestation          Vitals:    05/11/19 0320   BP: 133/80   Pulse: 98   Resp: 20   Temp: 98.6 °F (37 °C)   SpO2: 100%       EXAM :  RS- CTA-BL, ,  No wheezing , no crackles, good effort.  CVS- s1s2 tachycardic, has murmur in all areas.  ABD- soft,  distended, likely splenomegaly  EXT-2+ lower extremity edema,  Erythema of the left wrist.  NEURO- AAO-3, no focal defecit.      Old records reviewed and summarized in PM hx.    HPI:  29-year-old female presented to ED with a chief complaint of chills, myalgia, fever, exertional dyspnea-for 4 days.  Her last use of IV heroin was tonight.  She does have a history of MRSA approximately 4 years back.  Patient denies any complaint of cough, no complaint of for chest pain, do not complain of any bleeding, no known history of endoscope/colonoscope-denies any complaint of dark stools.    PM HX :  Hep C/liver cirrhosis-likely decompensated-noncompliant with medication.  .  Polysubstance abuse-IV heroine/fentanyl, Xanax    Smoker  History of biliary atresia    LABS:  Elevated LFTs-alk phos of 464, ALT of 21, AST of 44, total bili of 1.4, lipase of 35, lactic acid of 0.8, procalcitonin of 0.38.  Albumin-2.10  WBC- 3, neutrophils of 69, hemoglobin of 8, MCV of 84, platelet of 43, neutrophils of 69  INR-1.09  UA-moderate blood, protein 3+, RBC 13-20, WBC 3-5  Urine drug screen-opiate positive.  Calcium-8.0  Chest x-ray-no acute abnormality.  CT head negative    A/P:  - Ionized calcium, stool occult positive in the past, anemia work-up, echocardiogram in a.m. to rule out endocarditis,  - pt  Had episode of itching-after Antibiotics-need to monitor closely-she has received zosyn/vancy in past without any significant reaction      See above for further  detailed assessment and plan developed with APC which I have reviewed and/or edited.     I have discussed with findings, diagnosis and plans with the patient and family.     So Wynne MD 05/11/19 4:16 AM

## 2019-05-12 VITALS
OXYGEN SATURATION: 100 % | DIASTOLIC BLOOD PRESSURE: 103 MMHG | TEMPERATURE: 98.8 F | BODY MASS INDEX: 30.12 KG/M2 | RESPIRATION RATE: 18 BRPM | HEIGHT: 63 IN | SYSTOLIC BLOOD PRESSURE: 160 MMHG | WEIGHT: 170 LBS | HEART RATE: 90 BPM

## 2019-05-12 LAB
ALBUMIN SERPL-MCNC: 2 G/DL (ref 3.5–5.2)
ALBUMIN/GLOB SERPL: 0.6 G/DL
ALP SERPL-CCNC: 410 U/L (ref 39–117)
ALT SERPL W P-5'-P-CCNC: 26 U/L (ref 1–33)
ANION GAP SERPL CALCULATED.3IONS-SCNC: 7 MMOL/L
AST SERPL-CCNC: 48 U/L (ref 1–32)
BILIRUB SERPL-MCNC: 1.2 MG/DL (ref 0.2–1.2)
BUN BLD-MCNC: 13 MG/DL (ref 6–20)
BUN/CREAT SERPL: 12.7 (ref 7–25)
CALCIUM SPEC-SCNC: 7.6 MG/DL (ref 8.6–10.5)
CHLORIDE SERPL-SCNC: 117 MMOL/L (ref 98–107)
CO2 SERPL-SCNC: 19 MMOL/L (ref 22–29)
CREAT BLD-MCNC: 1.02 MG/DL (ref 0.57–1)
CRP SERPL-MCNC: 1.69 MG/DL (ref 0–0.5)
DEPRECATED RDW RBC AUTO: 49.8 FL (ref 37–54)
ERYTHROCYTE [DISTWIDTH] IN BLOOD BY AUTOMATED COUNT: 16 % (ref 12.3–15.4)
ERYTHROCYTE [SEDIMENTATION RATE] IN BLOOD: 13 MM/HR (ref 0–20)
GFR SERPL CREATININE-BSD FRML MDRD: 65 ML/MIN/1.73
GLOBULIN UR ELPH-MCNC: 3.2 GM/DL
GLUCOSE BLD-MCNC: 132 MG/DL (ref 65–99)
HAV IGM SERPL QL IA: ABNORMAL
HBV CORE IGM SERPL QL IA: ABNORMAL
HBV SURFACE AG SERPL QL IA: ABNORMAL
HCT VFR BLD AUTO: 22.6 % (ref 34–46.6)
HCT VFR BLD AUTO: 22.9 % (ref 34–46.6)
HCT VFR BLD AUTO: 22.9 % (ref 34–46.6)
HCV AB SER DONR QL: REACTIVE
HGB BLD-MCNC: 7.6 G/DL (ref 12–15.9)
HIV1+2 AB SER QL: NORMAL
MCH RBC QN AUTO: 28.4 PG (ref 26.6–33)
MCHC RBC AUTO-ENTMCNC: 33.2 G/DL (ref 31.5–35.7)
MCV RBC AUTO: 85.4 FL (ref 79–97)
PLATELET # BLD AUTO: 46 10*3/MM3 (ref 140–450)
PMV BLD AUTO: 10.4 FL (ref 6–12)
POTASSIUM BLD-SCNC: 4.9 MMOL/L (ref 3.5–5.2)
PROT SERPL-MCNC: 5.2 G/DL (ref 6–8.5)
RBC # BLD AUTO: 2.68 10*6/MM3 (ref 3.77–5.28)
SODIUM BLD-SCNC: 143 MMOL/L (ref 136–145)
VANCOMYCIN TROUGH SERPL-MCNC: 34.3 MCG/ML (ref 5–20)
WBC NRBC COR # BLD: 2.56 10*3/MM3 (ref 3.4–10.8)

## 2019-05-12 PROCEDURE — 99233 SBSQ HOSP IP/OBS HIGH 50: CPT | Performed by: INTERNAL MEDICINE

## 2019-05-12 PROCEDURE — 99231 SBSQ HOSP IP/OBS SF/LOW 25: CPT | Performed by: INTERNAL MEDICINE

## 2019-05-12 PROCEDURE — 85652 RBC SED RATE AUTOMATED: CPT | Performed by: INTERNAL MEDICINE

## 2019-05-12 PROCEDURE — 80202 ASSAY OF VANCOMYCIN: CPT

## 2019-05-12 PROCEDURE — 85018 HEMOGLOBIN: CPT | Performed by: INTERNAL MEDICINE

## 2019-05-12 PROCEDURE — 85027 COMPLETE CBC AUTOMATED: CPT | Performed by: INTERNAL MEDICINE

## 2019-05-12 PROCEDURE — 83036 HEMOGLOBIN GLYCOSYLATED A1C: CPT | Performed by: INTERNAL MEDICINE

## 2019-05-12 PROCEDURE — 80074 ACUTE HEPATITIS PANEL: CPT | Performed by: INTERNAL MEDICINE

## 2019-05-12 PROCEDURE — 25010000002 PIPERACILLIN SOD-TAZOBACTAM PER 1 G: Performed by: NURSE PRACTITIONER

## 2019-05-12 PROCEDURE — 25010000002 DAPTOMYCIN PER 1 MG: Performed by: INTERNAL MEDICINE

## 2019-05-12 PROCEDURE — 86140 C-REACTIVE PROTEIN: CPT | Performed by: INTERNAL MEDICINE

## 2019-05-12 PROCEDURE — 25010000002 VANCOMYCIN PER 500 MG

## 2019-05-12 PROCEDURE — 85014 HEMATOCRIT: CPT | Performed by: INTERNAL MEDICINE

## 2019-05-12 PROCEDURE — G0432 EIA HIV-1/HIV-2 SCREEN: HCPCS | Performed by: INTERNAL MEDICINE

## 2019-05-12 PROCEDURE — 80053 COMPREHEN METABOLIC PANEL: CPT | Performed by: INTERNAL MEDICINE

## 2019-05-12 RX ORDER — AMOXICILLIN AND CLAVULANATE POTASSIUM 875; 125 MG/1; MG/1
1 TABLET, FILM COATED ORAL EVERY 12 HOURS SCHEDULED
Status: DISCONTINUED | OUTPATIENT
Start: 2019-05-12 | End: 2019-05-12 | Stop reason: HOSPADM

## 2019-05-12 RX ORDER — PANTOPRAZOLE SODIUM 40 MG/1
40 TABLET, DELAYED RELEASE ORAL
Status: DISCONTINUED | OUTPATIENT
Start: 2019-05-13 | End: 2019-05-12 | Stop reason: HOSPADM

## 2019-05-12 RX ORDER — PROPRANOLOL HYDROCHLORIDE 20 MG/1
20 TABLET ORAL EVERY 12 HOURS SCHEDULED
Status: DISCONTINUED | OUTPATIENT
Start: 2019-05-12 | End: 2019-05-12 | Stop reason: HOSPADM

## 2019-05-12 RX ADMIN — PANTOPRAZOLE SODIUM 40 MG: 40 INJECTION, POWDER, FOR SOLUTION INTRAVENOUS at 10:14

## 2019-05-12 RX ADMIN — OXYCODONE HYDROCHLORIDE AND ACETAMINOPHEN 1 TABLET: 5; 325 TABLET ORAL at 16:01

## 2019-05-12 RX ADMIN — LORAZEPAM 0.5 MG: 0.5 TABLET ORAL at 10:13

## 2019-05-12 RX ADMIN — AMOXICILLIN AND CLAVULANATE POTASSIUM 1 TABLET: 875; 125 TABLET, FILM COATED ORAL at 14:53

## 2019-05-12 RX ADMIN — VANCOMYCIN HYDROCHLORIDE 1000 MG: 1 INJECTION, SOLUTION INTRAVENOUS at 02:53

## 2019-05-12 RX ADMIN — DAPTOMYCIN 350 MG: 500 INJECTION, POWDER, LYOPHILIZED, FOR SOLUTION INTRAVENOUS at 14:53

## 2019-05-12 RX ADMIN — PROPRANOLOL HYDROCHLORIDE 20 MG: 20 TABLET ORAL at 10:46

## 2019-05-12 RX ADMIN — NICOTINE 1 PATCH: 21 PATCH, EXTENDED RELEASE TRANSDERMAL at 10:14

## 2019-05-12 RX ADMIN — TAZOBACTAM SODIUM AND PIPERACILLIN SODIUM 3.38 G: 375; 3 INJECTION, SOLUTION INTRAVENOUS at 05:57

## 2019-05-12 NOTE — NURSING NOTE
Patient requested bed alarms to be off while boyfriend in room.  Up to bathroom and ambulating in room.  At @1650 patient was found to be off unit.  Security and charge nurse updated.  Patient returned to floor approximately 15 minutes later.  Charge nurse and security bedside.  ANGE espinoza signed and IV dc'd.  Dr Tracy updated by phone and came bedside to speak with patient before discharge.  Gave antibiotic prescription that was on chart for anticipated discharge tomorrow.  Ambulated to exit.

## 2019-05-12 NOTE — PLAN OF CARE
"Problem: Fall Risk (Adult)  Goal: Identify Related Risk Factors and Signs and Symptoms  Outcome: Ongoing (interventions implemented as appropriate)    Goal: Absence of Fall  Outcome: Outcome(s) achieved Date Met: 05/12/19      Problem: Liver Failure, Acute/Chronic (Adult)  Goal: Signs and Symptoms of Listed Potential Problems Will be Absent, Minimized or Managed (Liver Failure, Acute/Chronic)  Outcome: Ongoing (interventions implemented as appropriate)      Problem: Opioid Dependence/Withdrawal (Adult)  Goal: Identify Related Risk Factors and Signs and Symptoms  Outcome: Ongoing (interventions implemented as appropriate)    Goal: Withdrawal Symptoms Managed or Absent  Outcome: Ongoing (interventions implemented as appropriate)    Goal: Psychosocial Wellbeing  Outcome: Ongoing (interventions implemented as appropriate)      Problem: Patient Care Overview  Goal: Plan of Care Review  Outcome: Ongoing (interventions implemented as appropriate)   05/12/19 0319   Coping/Psychosocial   Plan of Care Reviewed With patient   Plan of Care Review   Progress no change   OTHER   Outcome Summary VSS, A&O. NSR on monitor. pt been told importance of calling out to get up to use bathroom but repeatedly sets bed alarm off. Pt is refusing to let lab take anymore blood and threatens to leave [facility]  Pt educated & reminded of her talk with Dr. Tracy  this afternoon regarding leaving [facility], about the severity her of illness and if she leaves hospital there is significant risk of worsening condition that could include death. She voices understanding of this matter.  Pt states \" I am still refusing to let lab stick me if they come early in the morning\".      Goal: Individualization and Mutuality  Outcome: Ongoing (interventions implemented as appropriate)    Goal: Discharge Needs Assessment  Outcome: Ongoing (interventions implemented as appropriate)        "

## 2019-05-12 NOTE — NURSING NOTE
Pt. refused ; 4 A.M. Vitals, daily weight check,  & labs this morning .  Pt was educated on importance of having the  above mentioned interventions completed.  Pt still refused care and threatening to leave [facility]

## 2019-05-12 NOTE — CONSULTS
INFECTIOUS DISEASE CONSULT/INITIAL HOSPITAL VISIT    Lindsey Aldridge  1990  [id number]    Date of Consult: 2019    Admission Date: 5/10/2019      Requesting Provider: So Wynne MD  Evaluating Physician:  Zelalem Esquivel MD    Cc: fever    Reason for Consultation: Acute febrile illness with prior history of endocarditis with active IV drug use   History of present illness:    Patient is a 28 y.o. female with h/o IVDU/polysubstance abuse/heroin/fentanyl/xanax, tobacco abuse, liver failure, Hep C, biliary atresia, and splenic infarct who presented to [facility] ED with generalized fatigue, fever, chills, sweats, shortness of breath, and myalgias over the last 4 days.  She last injected heroin prior to arrival at ED.  She reuses needles and does use clean needles.  She was at [facility] about 6 months ago for sepsis.  She had a h/o MRSA about 4 years ago.  She denies nausea, vomiting, diarrhea, or dysuria.  She has tried rehab facilities but got kicked out for testing positive for drugs while inpatient.  Work up in the ED showed Tmax 100, pancytopenia, PCT 0.38, and elevated LFTs.  A CT scan of chest/a/p showed advanced changes of liver cirrhosis, massive splenomegaly, numerous large varices, and diffuse mesenteric lymphadenopathy.  A CXR showed no acute findings.  Blood cultures are pending. She was started on Vancomycin and Zosyn.  ID was asked to evaluate and manage her antibiotic therapy.     Past Medical History:   Diagnosis Date   • Biliary atresia    • Hepatitis C    • Liver failure (CMS/HCC)    • Renal disorder    • Splenic artery injury    • Splenic infarction        Past Surgical History:   Procedure Laterality Date   •  SECTION         Family History   Problem Relation Age of Onset   • Heart disease Father        Social History     Socioeconomic History   • Marital status: Single     Spouse name: Not on file   • Number of children: Not on file   • Years of education: Not on file   • Highest  education level: Not on file   Tobacco Use   • Smoking status: Current Every Day Smoker     Packs/day: 1.00     Types: Cigarettes   • Smokeless tobacco: Never Used   Substance and Sexual Activity   • Alcohol use: Yes     Comment: OCCASIONALLY   • Drug use: Yes     Types: IV, Benzodiazepines     Comment: HEROIN AND FENTANYL - LAST USE 5/10/19 2300   • Sexual activity: Defer       Allergies   Allergen Reactions   • Aspirin Other (See Comments)     DETERIOTES BILE DUCTS         Medication:    Current Facility-Administered Medications:   •  acetaminophen (TYLENOL) tablet 650 mg, 650 mg, Oral, Q4H PRN, Himanshu, Maritza, APRN  •  amoxicillin-clavulanate (AUGMENTIN) 875-125 MG per tablet 1 tablet, 1 tablet, Oral, Q12H, Zelalem Esquivel MD, 1 tablet at 05/12/19 1453  •  DAPTOmycin (CUBICIN) 350 mg in sodium chloride 0.9 % 50 mL IVPB, 6 mg/kg (Adjusted), Intravenous, Q24H, Zelalem Esquivel MD, Last Rate: 100 mL/hr at 05/12/19 1453, 350 mg at 05/12/19 1453  •  LORazepam (ATIVAN) tablet 0.5 mg, 0.5 mg, Oral, Q8H PRN, Jonah Tracy MD, 0.5 mg at 05/12/19 1013  •  magnesium sulfate 4 gram infusion - Mg less than or equal to 1mg/dL, 4 g, Intravenous, PRN **OR** magnesium sulfate 3 gram infusion (1gm x 3) - Mg 1.1 - 1.5 mg/dL, 1 g, Intravenous, PRN, Last Rate: 100 mL/hr at 05/11/19 1637, 1 g at 05/11/19 1637 **OR** Magnesium Sulfate 2 gram infusion- Mg 1.6 - 1.9 mg/dL, 2 g, Intravenous, PRN, Jonah Tracy MD  •  nicotine (NICODERM CQ) 21 MG/24HR patch 1 patch, 1 patch, Transdermal, Q24H, Jonah Tracy MD, 1 patch at 05/12/19 1014  •  ondansetron (ZOFRAN) injection 4 mg, 4 mg, Intravenous, Q6H PRN, Jonah Tracy MD  •  oxyCODONE-acetaminophen (PERCOCET) 5-325 MG per tablet 1 tablet, 1 tablet, Oral, Q6H PRN, Jonah Tracy MD  •  [START ON 5/13/2019] pantoprazole (PROTONIX) EC tablet 40 mg, 40 mg, Oral, Q AM, Jonah Tracy MD  •  propranolol (INDERAL) tablet 20 mg, 20 mg, Oral, Q12H, West,  Jonah FELICIANO MD, 20 mg at 05/12/19 1046  •  [COMPLETED] Insert peripheral IV, , , Once **AND** sodium chloride 0.9 % flush 10 mL, 10 mL, Intravenous, PRN, Vaishali Dumont MD    Antibiotics:  Anti-Infectives (From admission, onward)    Ordered     Dose/Rate Route Frequency Start Stop    05/12/19 1247  DAPTOmycin (CUBICIN) 350 mg in sodium chloride 0.9 % 50 mL IVPB     Ordering Provider:  Zelalem Esquivel MD    6 mg/kg × 62.3 kg (Adjusted)  100 mL/hr over 30 Minutes Intravenous Every 24 Hours 05/12/19 1400 05/19/19 1359    05/12/19 1248  amoxicillin-clavulanate (AUGMENTIN) 875-125 MG per tablet 1 tablet     Ordering Provider:  Zelalem Esquivel MD    1 tablet Oral Every 12 Hours Scheduled 05/12/19 1400 05/19/19 0859    05/10/19 2319  vancomycin 2000 mg/500 mL 0.9% NS IVPB ([facility])     Ordering Provider:  [facility]cho[facility]    25 mg/kg × 77.1 kg  over 120 Minutes Intravenous Once 05/10/19 2321 05/11/19 0300    05/10/19 2314  piperacillin-tazobactam (ZOSYN) 3.375 g in iso-osmotic dextrose 50 ml (premix)     Ordering Provider:  Vaishali Dmuont MD    3.375 g  over 30 Minutes Intravenous Once 05/10/19 2316 05/11/19 0108            Review of Systems:  Constitutional-- No Fever, chills or sweats.  Appetite good, and no malaise. No fatigue.  HEENT-- No new vision, hearing or throat complaints.  No epistaxis or oral sores.  Denies odynophagia or dysphagia. No headache, photophobia or neck stiffness.  CV-- No chest pain, palpitation or syncope  Resp-- No SOB/cough/Hemoptysis  GI- No nausea, vomiting, or diarrhea.  No hematochezia, melena, or hematemesis. Denies jaundice or chronic liver disease.  -- No dysuria, hematuria, or flank pain.  Denies hesitancy, urgency or flank pain.  Lymph- no swollen lymph nodes in neck/axilla or groin.   Heme- No active bruising or bleeding; no Hx of DVT or PE.  MS-- no swelling or pain in the bones or joints of arms/legs.  No new back pain.  Neuro-- No acute focal weakness or numbness in  the arms or legs.  No seizures.  Skin--No rashes or lesions      Physical Exam:   Vital Signs  Temp (24hrs), Av.9 °F (36.6 °C), Min:97.3 °F (36.3 °C), Max:98.1 °F (36.7 °C)    Temp  Min: 97.3 °F (36.3 °C)  Max: 98.1 °F (36.7 °C)  BP  Min: 120/72  Max: 143/88  Pulse  Min: 85  Max: 94  Resp  Min: 18  Max: 20  SpO2  Min: 100 %  Max: 100 %    GENERAL: Awake and alert, in no acute distress.   HEENT: Normocephalic, atraumatic.  PERRL. EOMI. No conjunctival injection.  Some scleral icterus. Oropharynx clear without evidence of thrush or exudate. No evidence of peridontal disease.    NECK: Supple without nuchal rigidity. No mass.  LYMPH: No cervical, axillary or inguinal lymphadenopathy.  HEART: RRR; No murmur, rubs, gallops.   LUNGS: Clear to auscultation bilaterally without wheezing, rales, rhonchi. Normal respiratory effort. Nonlabored. No dullness.  ABDOMEN: Soft, nontender, nondistended. Positive bowel sounds. No rebound or guarding. NO mass or HSM.  EXT:  No cyanosis, clubbing or edema. No cord.  : Genitalia generally unremarkable.  Without Black catheter.  MSK: FROM without joint effusions noted arms/legs.    SKIN: Warm and dry without cutaneous eruptions on Inspection/palpation.    NEURO: Oriented to PPT. No focal deficits on motor/sensory exam at arms/legs.  PSYCHIATRIC: Normal insight and judgement. Cooperative with PE    Laboratory Data    Results from last 7 days   Lab Units 19  1120 19  0011 19  1805 19  1152 19  0501   WBC 10*3/mm3 2.56*  --   --  2.40* 2.46*   HEMOGLOBIN g/dL 7.6*  7.6* 7.6* 8.6* 8.1* 7.4*   HEMATOCRIT % 22.9*  22.9* 22.6* 25.8* 24.5* 22.1*   PLATELETS 10*3/mm3 46*  --   --  37* 35*     Results from last 7 days   Lab Units 19  1120   SODIUM mmol/L 143   POTASSIUM mmol/L 4.9   CHLORIDE mmol/L 117*   CO2 mmol/L 19.0*   BUN mg/dL 13   CREATININE mg/dL 1.02*   GLUCOSE mg/dL 132*   CALCIUM mg/dL 7.6*     Results from last 7 days   Lab Units  05/12/19  1120   ALK PHOS U/L 410*   BILIRUBIN mg/dL 1.2   ALT (SGPT) U/L 26   AST (SGOT) U/L 48*     Results from last 7 days   Lab Units 05/12/19  1120   SED RATE mm/hr 13     Results from last 7 days   Lab Units 05/12/19  1120   CRP mg/dL 1.69*     Results from last 7 days   Lab Units 05/11/19  0024   LACTATE mmol/L 0.8         Results from last 7 days   Lab Units 05/12/19  1120   VANCOMYCIN TR mcg/mL 34.30*     Estimated Creatinine Clearance: 80.8 mL/min (A) (by C-G formula based on SCr of 1.02 mg/dL (H)).      Microbiology:  Blood Culture   Date Value Ref Range Status   05/11/2019 No growth at 24 hours  Preliminary   05/11/2019 No growth at 24 hours  Preliminary                                Radiology:  Imaging Results (last 72 hours)     Procedure Component Value Units Date/Time    CT Abdomen Pelvis Without Contrast [[id number]] Collected:  05/11/19 1230     Updated:  05/11/19 2250    Narrative:       EXAMINATION: CT ABDOMEN/PELVIS WO CONTRAST, CT CHEST WO CONTRAST -  05/11/2019      INDICATION: A41.9-Sepsis, unspecified organism; F19.90-Other  psychoactive substance use, unspecified, uncomplicated.     TECHNIQUE: Spiral acquisition 5 mm unenhanced images through the chest,   abdomen and pelvis.     The radiation dose reduction device was turned on for each scan per the  ALARA (As Low as Reasonably Achievable) protocol.     COMPARISON: 03/23/2018 ultrasound.     FINDINGS: Previous exam report indicates minimal ascites, splenomegaly  to approximately 20 cm in length.     CHEST CT SCAN WITHOUT CONTRAST: There is minimal dependent atelectasis  in the posterior lower lobes. No significant pulmonary parenchymal  disease is seen elsewhere. There is no pleural effusion. Mediastinal  window images show no evidence of mass, significant adenopathy, or  pericardial effusion. Some residual thymus gland is present, not unusual  for age. Bony structures appear intact.       Impression:       Minimal dependent atelectasis of  the posterior lower lobes.  No clearly acute chest disease is seen.     ABDOMEN AND PELVIS CT SCAN WITHOUT CONTRAST: The liver is small,  markedly irregular and lobular, presumably consisting of multiple  regenerative nodules. The spleen is enlarged to at least 24 x 16.5 x  11.5 cm. No splenic infarct is identified. No splenic hematoma is  appreciated. There are very large varices at the splenic hilum and a  very large recanalized umbilical vein as well as additional numerous  varices throughout the abdomen. On unenhanced images, it is difficult to  distinguish varices from adenopathy. There is suspected to be, however,  moderate diffuse mesenteric lymphadenopathy.     Pancreas, adrenal glands and kidneys appear unremarkable. Gallbladder is  not clearly identified, either contracted or absent. No intra-abdominal  free air is seen. There is trace ascites. Bowel loops are normal in  caliber. No bowel wall edema or other evidence of inflammation is seen.  Uterus and ovaries appear small. Bony structures appear to be intact.     IMPRESSION:  1. Advanced changes of cirrhosis with small irregularly nodular liver,  massive splenomegaly, and numerous large varices. Small amount of  ascites.  2. Diffuse mesenteric lymphadenopathy, somewhat difficult to distinguish  from patient's varices but thought to be moderate in extent.  3. No definite inflammatory focus or other clearly acute intra-abdominal  or pelvic disease elsewhere.     DICTATED:   05/11/2019  EDITED/ls :   05/11/2019      This report was finalized on 5/11/2019 10:47 PM by DR. Sean Yin MD.       CT Chest Without Contrast [[id number]] Collected:  05/11/19 1230     Updated:  05/11/19 7010    Narrative:       EXAMINATION: CT ABDOMEN/PELVIS WO CONTRAST, CT CHEST WO CONTRAST -  05/11/2019      INDICATION: A41.9-Sepsis, unspecified organism; F19.90-Other  psychoactive substance use, unspecified, uncomplicated.     TECHNIQUE: Spiral acquisition 5 mm unenhanced images  through the chest,   abdomen and pelvis.     The radiation dose reduction device was turned on for each scan per the  ALARA (As Low as Reasonably Achievable) protocol.     COMPARISON: 03/23/2018 ultrasound.     FINDINGS: Previous exam report indicates minimal ascites, splenomegaly  to approximately 20 cm in length.     CHEST CT SCAN WITHOUT CONTRAST: There is minimal dependent atelectasis  in the posterior lower lobes. No significant pulmonary parenchymal  disease is seen elsewhere. There is no pleural effusion. Mediastinal  window images show no evidence of mass, significant adenopathy, or  pericardial effusion. Some residual thymus gland is present, not unusual  for age. Bony structures appear intact.       Impression:       Minimal dependent atelectasis of the posterior lower lobes.  No clearly acute chest disease is seen.     ABDOMEN AND PELVIS CT SCAN WITHOUT CONTRAST: The liver is small,  markedly irregular and lobular, presumably consisting of multiple  regenerative nodules. The spleen is enlarged to at least 24 x 16.5 x  11.5 cm. No splenic infarct is identified. No splenic hematoma is  appreciated. There are very large varices at the splenic hilum and a  very large recanalized umbilical vein as well as additional numerous  varices throughout the abdomen. On unenhanced images, it is difficult to  distinguish varices from adenopathy. There is suspected to be, however,  moderate diffuse mesenteric lymphadenopathy.     Pancreas, adrenal glands and kidneys appear unremarkable. Gallbladder is  not clearly identified, either contracted or absent. No intra-abdominal  free air is seen. There is trace ascites. Bowel loops are normal in  caliber. No bowel wall edema or other evidence of inflammation is seen.  Uterus and ovaries appear small. Bony structures appear to be intact.     IMPRESSION:  1. Advanced changes of cirrhosis with small irregularly nodular liver,  massive splenomegaly, and numerous large varices.  Small amount of  ascites.  2. Diffuse mesenteric lymphadenopathy, somewhat difficult to distinguish  from patient's varices but thought to be moderate in extent.  3. No definite inflammatory focus or other clearly acute intra-abdominal  or pelvic disease elsewhere.     DICTATED:   05/11/2019  EDITED/ls :   05/11/2019      This report was finalized on 5/11/2019 10:47 PM by DR. Sean Yin MD.       CT Head Without Contrast [[id number]] Collected:  05/11/19 0057     Updated:  05/11/19 0059    Narrative:       CT Head WO    HISTORY:   Severe headache for several days.    TECHNIQUE:   Axial unenhanced head CT. Radiation dose reduction techniques included automated exposure control or exposure modulation based on body size. Count of known CT and cardiac nuc med studies performed in previous 12 months: 0.     Time of scan: 0046 hours    COMPARISON:   3/17/2009    FINDINGS:   No intracranial hemorrhage, mass, or infarct. No hydrocephalus or extra-axial fluid collection. Brain parenchymal density is normal. The skull base, calvarium, and extracranial soft tissues are normal.      Impression:       Normal, negative unenhanced head CT.          Signer Name: ITZEL Marte MD   Signed: 5/11/2019 12:57 AM   Workstation Name: [id number]       XR Chest 1 View [[id number]] Collected:  05/11/19 0042     Updated:  05/11/19 0044    Narrative:       CR Chest 1 Vw    INDICATION:   Fatigue and chills; current smoker.     COMPARISON:    3/23/2018    FINDINGS:  Single portable AP view of the chest.  The heart and mediastinal contours are normal. The lungs are clear. No pneumothorax or pleural effusion.      Impression:       No acute cardiopulmonary findings.    Signer Name: ITZEL Marte MD   Signed: 5/11/2019 12:42 AM   Workstation Name: [id number]       XR Hand 3+ View Left [[id number]] Collected:  05/11/19 0041     Updated:  05/11/19 0044    Narrative:       CR Hand Min 3 Vws LT    INDICATION:   Complains of left hand pain.  Patient using IV drugs and missed.    COMPARISON:   None available.    FINDINGS:   3 views of the left hand.  No fracture or dislocation.  No bone erosion or destruction.  Mild soft tissue swelling over the dorsum of hand. No radiopaque foreign body. No soft tissue gas.      Impression:       Nonspecific dorsal and soft tissue swelling.    Signer Name: ITZEL Marte MD   Signed: 5/11/2019 12:41 AM   Workstation Name: [email or web address]               PROBLEM LIST:   --Sepsis POA with fever, pancytopenia, elevated PCT.  --Acute left hand swelling  --Fever, unclear source most likely from left hand acute cellulitis  --Pancytopenia, secondary to above + liver cirrhosis and splenomegaly  --Elevated PCT, mild, secondary to above  --Biliary atresia/Liver cirrhosis, advanced with multiple varices  --Massive splenomegaly  --Diffuse mesenteric lymphadenopathy  --Hep C  --Polysubstance abuse/IVDU/Heroin/Fentanyl/Xanax.  Last used PTA  --Tobacco abuse    ASSESSMENT: Patient is a 28 y.o. female with h/o IVDU/polysubstance abuse/heroin/fentanyl/xanax, tobacco abuse, liver failure, Hep C, biliary atresia, and splenic infarct admitted for sepsis with concerns for endocarditis.  She does not use clean needles.  She last injected heroin/fentanyl PTA.  Work up showed advanced liver cirrhosis and massive splenomegaly with mesenteric lymphadenopathy and pancytopenia.  Hem/Onc is following patient for pancytopenia.  Her CXR is clear.  We will continue bs abx coverage     However 2D echo reviewed showing no evidence of endocarditis and now with negative blood cultures feel most likely source of her acute febrile illness from active IV drug use with acute left hand cellulitis now improving with broad-spectrum antibiotics of vancomycin and Zosyn but she is now refusing sticks and lab draw so I feel we can simplify antibiotics today with daptomycin 6 mg/kg grams x1 until blood cultures finalizes negative and continue Augmentin for oral janelle  for possible strep or anaerobe associated left hand cellulitis improving.    PLAN/RECOMMENDATIONS:   Has chronic liver disease needs ongoing follow-up with liver clinic at   No evidence of endocarditis to this time with 2D echo stable and negative blood cultures  Simplify antibiotics today with daptomycin IV once daily and oral Augmentin    As long as blood cultures are negative okay with discharge home tomorrow on clindamycin 300 mg p.o. 3 times daily x5 days and I left written prescription in paper chart    UM:  DC planning on oral antibiotics    Zelalem Esquivel MD saw and examined patient, verified hx and PE, read all radiographic studies, reviewed labs and micro data, and formulated dx, plan for treatment and all medical decision making.      Shahzad Mcnulty PA-C for MD Zelalem Miller MD  5/12/2019  3:22 PM

## 2019-05-12 NOTE — PROGRESS NOTES
Subjective     PROBLEM LIST:  Patient Active Problem List   Diagnosis   • Polysubstance abuse (CMS/HCC)   • Thrombocytopenia (CMS/HCC)   • Normocytic anemia   • Hepatitis C   • Sepsis (CMS/HCC)   • Biliary atresia   • Splenic infarction history    • Liver failure (CMS/HCC)         INTERVAL HISTORY: feeling about the same today.   Hasn't noticed any idfference on antibiotics.  No fevers since yesterday.  Reports just feeling tired.      REVIEW OF SYSTEMS:  A 14 point review of systems was performed and is negative except as noted above.      Objective     Vitals:    05/12/19 0005 05/12/19 0736 05/12/19 1046 05/12/19 1139   BP: 120/72 137/84  143/88   BP Location: Right arm Right arm  Right arm   Patient Position: Lying Lying  Lying   Pulse: 85  94 90   Resp: 18 18  20   Temp: 98.1 °F (36.7 °C) 98 °F (36.7 °C)  98.1 °F (36.7 °C)   TempSrc: Oral Oral  Oral   SpO2: 100%   100%   Weight:       Height:           Performance Status: 2  General: well appearing female in no acute distress  Neuro: alert and oriented  HEENT: sclerae anicteric, oropharynx clear  Lymphatics: no cervical, supraclavicular, or axillary adenopathy  Cardiovascular: regular rate and rhythm, no murmurs  Lungs: clear to auscultation bilaterally  Abdomen: soft, nontender, nondistended.  No palpable organomegaly  Extremities: no lower extremity edema  Skin: no rashes, lesions, bruising, or petechiae  Psych: mood and affect appropriate        Labs: reviewed.   Results for DEYANIRA EASON (MRN [id number]) as of 5/12/2019 12:49   Ref. Range 5/12/2019 11:20   Glucose Latest Ref Range: 65 - 99 mg/dL 132 (H)   Sodium Latest Ref Range: 136 - 145 mmol/L 143   Potassium Latest Ref Range: 3.5 - 5.2 mmol/L 4.9   CO2 Latest Ref Range: 22.0 - 29.0 mmol/L 19.0 (L)   Chloride Latest Ref Range: 98 - 107 mmol/L 117 (H)   Anion Gap Latest Units: mmol/L 7.0   Creatinine Latest Ref Range: 0.57 - 1.00 mg/dL 1.02 (H)   BUN Latest Ref Range: 6 - 20 mg/dL 13    BUN/Creatinine Ratio Latest Ref Range: 7.0 - 25.0  12.7   Calcium Latest Ref Range: 8.6 - 10.5 mg/dL 7.6 (L)   eGFR Non African Am Latest Ref Range: >60 mL/min/1.73 65   Alkaline Phosphatase Latest Ref Range: 39 - 117 U/L 410 (H)   Total Protein Latest Ref Range: 6.0 - 8.5 g/dL 5.2 (L)   ALT (SGPT) Latest Ref Range: 1 - 33 U/L 26   AST (SGOT) Latest Ref Range: 1 - 32 U/L 48 (H)   Total Bilirubin Latest Ref Range: 0.2 - 1.2 mg/dL 1.2   Albumin Latest Ref Range: 3.50 - 5.20 g/dL 2.00 (L)   Globulin Latest Units: gm/dL 3.2   A/G Ratio Latest Units: g/dL 0.6   C-Reactive Protein Latest Ref Range: 0.00 - 0.50 mg/dL 1.69 (H)   WBC Latest Ref Range: 3.40 - 10.80 10*3/mm3 2.56 (L)   RBC Latest Ref Range: 3.77 - 5.28 10*6/mm3 2.68 (L)   Hemoglobin Latest Ref Range: 12.0 - 15.9 g/dL 7.6 (L)   Hematocrit Latest Ref Range: 34.0 - 46.6 % 22.9 (L)   RDW Latest Ref Range: 12.3 - 15.4 % 16.0 (H)   MCV Latest Ref Range: 79.0 - 97.0 fL 85.4   MCH Latest Ref Range: 26.6 - 33.0 pg 28.4   MCHC Latest Ref Range: 31.5 - 35.7 g/dL 33.2   MPV Latest Ref Range: 6.0 - 12.0 fL 10.4   Platelets Latest Ref Range: 140 - 450 10*3/mm3 46 (C)   RDW-SD Latest Ref Range: 37.0 - 54.0 fl 49.8   Sed Rate Latest Ref Range: 0 - 20 mm/hr 13   Hep A IgM Latest Ref Range: Non-Reactive  Non-Reactive   Hepatitis B Surface Ag Latest Ref Range: Non-Reactive  Non-Reactive   Hep B C IgM Latest Ref Range: Non-Reactive  Non-Reactive   Hepatitis C Ab Latest Ref Range: Non-Reactive  Reactive (A)   HIV-1/ HIV-2 Ab Latest Ref Range: Non-Reactive  Non-Reactive   Vancomycin Trough Latest Ref Range: 5.00 - 20.00 mcg/mL 34.30 (C)         Assessment/Plan     Lindsey Aldridge is a 28 y.o. year old female with a history of polysubstance abuse, cirrhosis, and hepatitis C who has pancytopenia in the setting of probable infection.    Blood counts slightly improved today and close to her baseline, though with significant anemia.  hgb relatively stable overnight.    No  positive cultures so far or evidnece of vegetations on echo.    Continue to monitor counts.  Given findings on CT scan of significant varices, she is high risk for bleeding.           Susan Denny MD  5/12/2019

## 2019-05-12 NOTE — PROGRESS NOTES
[facility] Medicine Services  PROGRESS NOTE    Patient Name: Lindsey Aldridge  : 1990  MRN: [id number]    Date of Admission: 5/10/2019  Length of Stay: 2  Primary Care Physician: Provider, No Known    Subjective   Subjective     CC:  Fever, cellulitis, ivdu    HPI:  Fatigued, mild nausea. Hand pain improved. No chest pain. Abdominal distension chronic. No melena or brbpr    Review of Systems  No headache  Otherwise ROS is negative except as mentioned in the HPI.    Objective   Objective     Vital Signs:   Temp:  [97.3 °F (36.3 °C)-99 °F (37.2 °C)] 98.1 °F (36.7 °C)  Heart Rate:  [85-94] 90  Resp:  [18-20] 20  BP: (120-143)/(72-90) 143/88        Physical Exam:  Alert, nontoxic appearing, ox3  Ncat, oroph clear  rrr  ctab  abd soft, mildly distended, nontender today  No cce  Left arm/hand minimal edema, erythema nearly resolved, palpable radial pulse  Face symmetric, speech clear, equal     Results Reviewed:  I have personally reviewed current lab, radiology, and data and agree.    Results from last 7 days   Lab Units 19  1120 19  0011 19  1805 19  1152 19  0501   WBC 10*3/mm3 2.56*  --   --  2.40* 2.46*   HEMOGLOBIN g/dL 7.6*  7.6* 7.6* 8.6* 8.1* 7.4*   HEMATOCRIT % 22.9*  22.9* 22.6* 25.8* 24.5* 22.1*   PLATELETS 10*3/mm3 46*  --   --  37* 35*   INR   --   --   --   --  1.24*     Results from last 7 days   Lab Units 19  1120 19  0501 19  0024   SODIUM mmol/L 143 143 141   POTASSIUM mmol/L 4.9 4.3 4.5   CHLORIDE mmol/L 117* 114* 111*   CO2 mmol/L 19.0* 21.0* 22.0   BUN mg/dL 13 16 17   CREATININE mg/dL 1.02* 0.83 0.79   GLUCOSE mg/dL 132* 123* 109*   CALCIUM mg/dL 7.6* 7.3* 8.0*   ALT (SGPT) U/L 26 20 21   AST (SGOT) U/L 48* 41* 44*     Estimated Creatinine Clearance: 80.8 mL/min (A) (by C-G formula based on SCr of 1.02 mg/dL (H)).    No results found for: BNP    Microbiology Results Abnormal     Procedure Component Value -  Date/Time    Blood Culture - Blood, Arm, Left [[id number]] Collected:  05/11/19 0024    Lab Status:  Preliminary result Specimen:  Blood from Arm, Left Updated:  05/12/19 0145     Blood Culture No growth at 24 hours    Blood Culture - Blood, Arm, Right [[id number]] Collected:  05/11/19 0024    Lab Status:  Preliminary result Specimen:  Blood from Arm, Right Updated:  05/12/19 0145     Blood Culture No growth at 24 hours    Influenza Antigen, Rapid - Swab, Nasopharynx [[id number]]  (Normal) Collected:  05/10/19 2333    Lab Status:  Final result Specimen:  Swab from Nasopharynx Updated:  05/11/19 0024     Influenza A Ag, EIA Negative     Influenza B Ag, EIA Negative          Imaging Results (last 24 hours)     Procedure Component Value Units Date/Time    CT Abdomen Pelvis Without Contrast [[id number]] Collected:  05/11/19 1230     Updated:  05/11/19 2250    Narrative:       EXAMINATION: CT ABDOMEN/PELVIS WO CONTRAST, CT CHEST WO CONTRAST -  05/11/2019      INDICATION: A41.9-Sepsis, unspecified organism; F19.90-Other  psychoactive substance use, unspecified, uncomplicated.     TECHNIQUE: Spiral acquisition 5 mm unenhanced images through the chest,   abdomen and pelvis.     The radiation dose reduction device was turned on for each scan per the  ALARA (As Low as Reasonably Achievable) protocol.     COMPARISON: 03/23/2018 ultrasound.     FINDINGS: Previous exam report indicates minimal ascites, splenomegaly  to approximately 20 cm in length.     CHEST CT SCAN WITHOUT CONTRAST: There is minimal dependent atelectasis  in the posterior lower lobes. No significant pulmonary parenchymal  disease is seen elsewhere. There is no pleural effusion. Mediastinal  window images show no evidence of mass, significant adenopathy, or  pericardial effusion. Some residual thymus gland is present, not unusual  for age. Bony structures appear intact.       Impression:       Minimal dependent atelectasis of the posterior lower lobes.  No clearly  acute chest disease is seen.     ABDOMEN AND PELVIS CT SCAN WITHOUT CONTRAST: The liver is small,  markedly irregular and lobular, presumably consisting of multiple  regenerative nodules. The spleen is enlarged to at least 24 x 16.5 x  11.5 cm. No splenic infarct is identified. No splenic hematoma is  appreciated. There are very large varices at the splenic hilum and a  very large recanalized umbilical vein as well as additional numerous  varices throughout the abdomen. On unenhanced images, it is difficult to  distinguish varices from adenopathy. There is suspected to be, however,  moderate diffuse mesenteric lymphadenopathy.     Pancreas, adrenal glands and kidneys appear unremarkable. Gallbladder is  not clearly identified, either contracted or absent. No intra-abdominal  free air is seen. There is trace ascites. Bowel loops are normal in  caliber. No bowel wall edema or other evidence of inflammation is seen.  Uterus and ovaries appear small. Bony structures appear to be intact.     IMPRESSION:  1. Advanced changes of cirrhosis with small irregularly nodular liver,  massive splenomegaly, and numerous large varices. Small amount of  ascites.  2. Diffuse mesenteric lymphadenopathy, somewhat difficult to distinguish  from patient's varices but thought to be moderate in extent.  3. No definite inflammatory focus or other clearly acute intra-abdominal  or pelvic disease elsewhere.     DICTATED:   05/11/2019  EDITED/ls :   05/11/2019      This report was finalized on 5/11/2019 10:47 PM by DR. Sean Yin MD.       CT Chest Without Contrast [[id number]] Collected:  05/11/19 1230     Updated:  05/11/19 2250    Narrative:       EXAMINATION: CT ABDOMEN/PELVIS WO CONTRAST, CT CHEST WO CONTRAST -  05/11/2019      INDICATION: A41.9-Sepsis, unspecified organism; F19.90-Other  psychoactive substance use, unspecified, uncomplicated.     TECHNIQUE: Spiral acquisition 5 mm unenhanced images through the chest,   abdomen and  pelvis.     The radiation dose reduction device was turned on for each scan per the  ALARA (As Low as Reasonably Achievable) protocol.     COMPARISON: 03/23/2018 ultrasound.     FINDINGS: Previous exam report indicates minimal ascites, splenomegaly  to approximately 20 cm in length.     CHEST CT SCAN WITHOUT CONTRAST: There is minimal dependent atelectasis  in the posterior lower lobes. No significant pulmonary parenchymal  disease is seen elsewhere. There is no pleural effusion. Mediastinal  window images show no evidence of mass, significant adenopathy, or  pericardial effusion. Some residual thymus gland is present, not unusual  for age. Bony structures appear intact.       Impression:       Minimal dependent atelectasis of the posterior lower lobes.  No clearly acute chest disease is seen.     ABDOMEN AND PELVIS CT SCAN WITHOUT CONTRAST: The liver is small,  markedly irregular and lobular, presumably consisting of multiple  regenerative nodules. The spleen is enlarged to at least 24 x 16.5 x  11.5 cm. No splenic infarct is identified. No splenic hematoma is  appreciated. There are very large varices at the splenic hilum and a  very large recanalized umbilical vein as well as additional numerous  varices throughout the abdomen. On unenhanced images, it is difficult to  distinguish varices from adenopathy. There is suspected to be, however,  moderate diffuse mesenteric lymphadenopathy.     Pancreas, adrenal glands and kidneys appear unremarkable. Gallbladder is  not clearly identified, either contracted or absent. No intra-abdominal  free air is seen. There is trace ascites. Bowel loops are normal in  caliber. No bowel wall edema or other evidence of inflammation is seen.  Uterus and ovaries appear small. Bony structures appear to be intact.     IMPRESSION:  1. Advanced changes of cirrhosis with small irregularly nodular liver,  massive splenomegaly, and numerous large varices. Small amount of  ascites.  2.  Diffuse mesenteric lymphadenopathy, somewhat difficult to distinguish  from patient's varices but thought to be moderate in extent.  3. No definite inflammatory focus or other clearly acute intra-abdominal  or pelvic disease elsewhere.     DICTATED:   05/11/2019  EDITED/ls :   05/11/2019      This report was finalized on 5/11/2019 10:47 PM by DR. Sean Yin MD.             Results for orders placed during the hospital encounter of 05/10/19   Adult Transthoracic Echo Complete W/ Cont if Necessary Per Protocol    Narrative · Left ventricular wall thickness is consistent with mild concentric   hypertrophy.  · Left atrial cavity size is mild-to-moderately dilated.  · Estimated EF = 62%.  · Left ventricular systolic function is normal.  · Left ventricular diastolic function is normal.  · Normal right ventricular cavity size, wall thickness, systolic function   and septal motion noted.  · No evidence of pulmonary hypertension is present.  · There is no evidence of pericardial effusion.  · No significant structural valvular abnormality demonstrated.          I have reviewed the medications:    Current Facility-Administered Medications:   •  acetaminophen (TYLENOL) tablet 650 mg, 650 mg, Oral, Q4H PRN, Himanshu, Maritza, APRN  •  amoxicillin-clavulanate (AUGMENTIN) 875-125 MG per tablet 1 tablet, 1 tablet, Oral, Q12H, Zelalem Esquivel MD  •  DAPTOmycin (CUBICIN) 350 mg in sodium chloride 0.9 % 50 mL IVPB, 6 mg/kg (Adjusted), Intravenous, Q24H, Zelalem Esquivel MD  •  LORazepam (ATIVAN) tablet 0.5 mg, 0.5 mg, Oral, Q8H PRN, Jonah rTacy MD, 0.5 mg at 05/12/19 1013  •  magnesium sulfate 4 gram infusion - Mg less than or equal to 1mg/dL, 4 g, Intravenous, PRN **OR** magnesium sulfate 3 gram infusion (1gm x 3) - Mg 1.1 - 1.5 mg/dL, 1 g, Intravenous, PRN, Last Rate: 100 mL/hr at 05/11/19 1637, 1 g at 05/11/19 1637 **OR** Magnesium Sulfate 2 gram infusion- Mg 1.6 - 1.9 mg/dL, 2 g, Intravenous, PRN, Jonah Tracy MD  •   "nicotine (NICODERM CQ) 21 MG/24HR patch 1 patch, 1 patch, Transdermal, Q24H, Jonah Tracy MD, 1 patch at 05/12/19 1014  •  ondansetron (ZOFRAN) injection 4 mg, 4 mg, Intravenous, Q6H PRN, Jonah Tracy MD  •  oxyCODONE-acetaminophen (PERCOCET) 5-325 MG per tablet 1 tablet, 1 tablet, Oral, Q6H PRN, Jonah Tracy MD  •  [START ON 5/13/2019] pantoprazole (PROTONIX) EC tablet 40 mg, 40 mg, Oral, Q AM, Jonah Tracy MD  •  propranolol (INDERAL) tablet 20 mg, 20 mg, Oral, Q12H, Jonah Tracy MD, 20 mg at 05/12/19 1046  •  [COMPLETED] Insert peripheral IV, , , Once **AND** sodium chloride 0.9 % flush 10 mL, 10 mL, Intravenous, PRN, Vaishali Dumont MD      Assessment/Plan   Assessment / Plan     Active Hospital Problems    Diagnosis POA   • Polysubstance abuse (CMS/HCC) [F19.10] Yes   • Thrombocytopenia (CMS/HCC) [D69.6] Yes   • Normocytic anemia [D64.9] Yes   • Hepatitis C [B19.20] Yes   • Sepsis (CMS/HCC) [A41.9] Yes   • Biliary atresia [Q44.2] Not Applicable   • Splenic infarction history  [D73.5] Yes   • Liver failure (CMS/HCC) [K72.90] Yes          Brief Hospital Course to date:  27 yo f w/ hx of 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 Apparently \"should be on the liver transplant list\" but isn't due to drug abuse. Patient reports sobriety a few months ago, but has now relapsed for past few months injecting heroin, typically into left hand. Denies etoh or benzodiazepine use. Denies n/v, describes normal colored stools (no melena or blood in stool). Has chronic mild abdominal distension and mild chronic abdominal pain which is essentially unchanged but has noted BLE edema over last couple weeks   Presented with ~2 days subjective fever, chills, myalgias, left hand swelling & pain w/ redness to left hand, BLE leg edema. + d-dimer. " Left hand xray negative for fracture, procalcitonin elevated, mild hyperbilirubinemia, and elevated alk phos, low magnesium. Admitted with sepsis (fevers at home, elevated procalcitonin), left hand cellulitis, pancytopenia w/ anemia, abdominal distension & LE edema.    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) w/ splenomegaly & varices   -follows w/ GI at  as outpatient   -states s/p evaluation for liver transplant, but no on list due to ongoing ivdu    -ct a/p 5/11/19: cirrhosis, splenomegaly, small ascites, numerous large varices & w/ possible mesenteric lymphadenopathy (difficult to distinguis from patient's varices)   *pancytopenia (stable blood counts, due to above), guaic negative    -heme following, probably related to cirrhosis & splenomegally  *hypomagnesemia, replaced  ------------------------------------------------------------------------------------------  Plan:  -continue abx per ID, consult pending (hand cellulitis improved); cultures negative thus far  -pancytopenia & hgb appear stable (no melena, guaiac negative);s/p heme evaluation; add propranolol 20mg bid (varices); ppi; monitor for signs bleeding; follow up GI   - records pending/requested  -on low dose oxycodone, ativan, weaning and patient understands will not receive scripts for scheduled medicine at d/c    -cbc in a.m.    *dispo: feels fatigued and hasn't eaten much today. No fevers overnight. ID consult pending. Possible d/c home tomorrow once final    CODE STATUS:   Code Status and Medical Interventions:   Ordered at: 05/11/19 0328     Level Of Support Discussed With:    Patient     Code  Status:    CPR     Medical Interventions (Level of Support Prior to Arrest):    Full         Electronically signed by Jonah Tracy MD, 05/12/19, 1:41 PM.

## 2019-05-12 NOTE — DISCHARGE SUMMARY
[facility] Medicine Services  ELOPEMENT AGAINST MEDICAL ADVICE    Patient Name: Lindsey Aldridge  : 1990  MRN: [id number]    Date of Admission: 5/10/2019  Date of Elopement:  2019  Primary Care Physician: Provider, No Known    Consults     Date and Time Order Name Status Description    2019 0713 Inpatient Hematology & Oncology Consult Completed     2019 0349 Inpatient Infectious Diseases Consult Completed         Hospital Course     Presenting Problem:   Sepsis, due to unspecified organism (CMS/HCC) [A41.9]  Sepsis, due to unspecified organism (CMS/HCC) [A41.9]    Active Hospital Problems    Diagnosis  POA   • Polysubstance abuse (CMS/HCC) [F19.10]  Yes   • Thrombocytopenia (CMS/HCC) [D69.6]  Yes   • Normocytic anemia [D64.9]  Yes   • Hepatitis C [B19.20]  Yes   • Sepsis (CMS/HCC) [A41.9]  Yes   • 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              -follows w/ GI at  as outpatient              -states s/p evaluation for liver transplant, but no on list due to ongoing ivdu              -ct a/p 19: cirrhosis, splenomegaly, small ascites, numerous large varices & w/ possible mesenteric lymphadenopathy (difficult to distinguis from patient's varices)   *pancytopenia (stable blood counts, due to above), guaic  "negative              -heme following, probably related to cirrhosis & splenomegally  ---------------------------------------------------------      Hospital Course:  29 yo f w/ hx of biliary atresia (s/p surgery at [facility] as child), cirrhosis (follows w/ gi at ), hepatitis c, ongoing ivdu (last used heroin day of presentation to ER), anemia, and tobacco abuse. Apparently \"should be on the liver transplant list\" but isn't due to drug abuse. Patient reports sobriety a few months ago, but has now relapsed for past few months injecting heroin, typically into left hand. Denies etoh or benzodiazepine use. Denies n/v, describes normal colored stools (no melena or blood in stool). Has chronic mild abdominal distension and mild chronic abdominal pain which is essentially unchanged but has noted BLE edema over last couple weeks              Presented with ~2 days subjective fever, chills, myalgias, left hand swelling & pain w/ redness to left hand, BLE leg edema. + d-dimer. Left hand xray negative for fracture, procalcitonin elevated, mild hyperbilirubinemia, and elevated alk phos, low magnesium. Admitted with sepsis (fevers at home, elevated procalcitonin), left hand cellulitis, pancytopenia w/ anemia, abdominal distension & LE edema. Initiated on iv antibiotics. Ct a/p revealed cirrhotic appearing liver, large spleen. LUE duplex was negative for dvt. TTE revealed no valvular vegetations. Patient has been afebrile and left hand edema/cellulitis had improved. ID consulted. Patient left the medical unit to go outside. The patient had been counseled extensively multiple times regarding the wandering policy here at [facility], and thus relinquished her inpatient status per policy that patient agreed to. Patient is being provided the clindamycin scipt that infectious disease provided on the chart. Patient has been instructed to go to nearest ER if develops fever or chills and she voiced understanding. " Patient was also encouraged to resume relationship with her [facility] gastroenterologist and to cease all illicit drug use.      **Patient left AMA prior to completion of evaluation and management**      Day of Discharge     HPI:   **Patient left AMA prior to completion of evaluation and management**    Vital Signs:   Temp:  [97.3 °F (36.3 °C)-98.8 °F (37.1 °C)] 98.8 °F (37.1 °C)  Heart Rate:  [85-94] 90  Resp:  [18-20] 18  BP: (120-160)/() 160/103     Physical Exam (if applicable):  See progree note    Pertinent  and/or Most Recent Results     Results from last 7 days   Lab Units 05/12/19  1120 05/12/19  0011 05/11/19  1805 05/11/19  1152 05/11/19  0501 05/11/19  0024   WBC 10*3/mm3 2.56*  --   --  2.40* 2.46* 3.45   HEMOGLOBIN g/dL 7.6*  7.6* 7.6* 8.6* 8.1* 7.4* 8.0*   HEMATOCRIT % 22.9*  22.9* 22.6* 25.8* 24.5* 22.1* 23.4*   PLATELETS 10*3/mm3 46*  --   --  37* 35* 43*   SODIUM mmol/L 143  --   --   --  143 141   POTASSIUM mmol/L 4.9  --   --   --  4.3 4.5   CHLORIDE mmol/L 117*  --   --   --  114* 111*   CO2 mmol/L 19.0*  --   --   --  21.0* 22.0   BUN mg/dL 13  --   --   --  16 17   CREATININE mg/dL 1.02*  --   --   --  0.83 0.79   GLUCOSE mg/dL 132*  --   --   --  123* 109*   CALCIUM mg/dL 7.6*  --   --   --  7.3* 8.0*     Results from last 7 days   Lab Units 05/12/19  1120 05/11/19  0501 05/11/19  0024   BILIRUBIN mg/dL 1.2 1.1 1.4*   ALK PHOS U/L 410* 425* 464*   ALT (SGPT) U/L 26 20 21   AST (SGOT) U/L 48* 41* 44*   PROTIME Seconds  --  15.0*  --    INR   --  1.24*  --    APTT seconds  --  41.0*  --            Invalid input(s): TG, LDLCALC, LDLREALC      Brief Urine Lab Results  (Last result in the past 365 days)      Color   Clarity   Blood   Leuk Est   Nitrite   Protein   CREAT   Urine HCG        05/10/19 2333 Dark Yellow Clear Moderate (2+) Trace Negative >=300 mg/dL (3+)               Microbiology Results Abnormal     Procedure Component Value - Date/Time    Blood Culture - Blood, Arm,  Left [[id number]] Collected:  05/11/19 0024    Lab Status:  Preliminary result Specimen:  Blood from Arm, Left Updated:  05/12/19 0145     Blood Culture No growth at 24 hours    Blood Culture - Blood, Arm, Right [[id number]] Collected:  05/11/19 0024    Lab Status:  Preliminary result Specimen:  Blood from Arm, Right Updated:  05/12/19 0145     Blood Culture No growth at 24 hours    Influenza Antigen, Rapid - Swab, Nasopharynx [[id number]]  (Normal) Collected:  05/10/19 2333    Lab Status:  Final result Specimen:  Swab from Nasopharynx Updated:  05/11/19 0024     Influenza A Ag, EIA Negative     Influenza B Ag, EIA Negative          Imaging Results (all)     Procedure Component Value Units Date/Time    CT Abdomen Pelvis Without Contrast [[id number]] Collected:  05/11/19 1230     Updated:  05/11/19 2250    Narrative:       EXAMINATION: CT ABDOMEN/PELVIS WO CONTRAST, CT CHEST WO CONTRAST -  05/11/2019      INDICATION: A41.9-Sepsis, unspecified organism; F19.90-Other  psychoactive substance use, unspecified, uncomplicated.     TECHNIQUE: Spiral acquisition 5 mm unenhanced images through the chest,   abdomen and pelvis.     The radiation dose reduction device was turned on for each scan per the  ALARA (As Low as Reasonably Achievable) protocol.     COMPARISON: 03/23/2018 ultrasound.     FINDINGS: Previous exam report indicates minimal ascites, splenomegaly  to approximately 20 cm in length.     CHEST CT SCAN WITHOUT CONTRAST: There is minimal dependent atelectasis  in the posterior lower lobes. No significant pulmonary parenchymal  disease is seen elsewhere. There is no pleural effusion. Mediastinal  window images show no evidence of mass, significant adenopathy, or  pericardial effusion. Some residual thymus gland is present, not unusual  for age. Bony structures appear intact.       Impression:       Minimal dependent atelectasis of the posterior lower lobes.  No clearly acute chest disease is seen.     ABDOMEN AND PELVIS  CT SCAN WITHOUT CONTRAST: The liver is small,  markedly irregular and lobular, presumably consisting of multiple  regenerative nodules. The spleen is enlarged to at least 24 x 16.5 x  11.5 cm. No splenic infarct is identified. No splenic hematoma is  appreciated. There are very large varices at the splenic hilum and a  very large recanalized umbilical vein as well as additional numerous  varices throughout the abdomen. On unenhanced images, it is difficult to  distinguish varices from adenopathy. There is suspected to be, however,  moderate diffuse mesenteric lymphadenopathy.     Pancreas, adrenal glands and kidneys appear unremarkable. Gallbladder is  not clearly identified, either contracted or absent. No intra-abdominal  free air is seen. There is trace ascites. Bowel loops are normal in  caliber. No bowel wall edema or other evidence of inflammation is seen.  Uterus and ovaries appear small. Bony structures appear to be intact.     IMPRESSION:  1. Advanced changes of cirrhosis with small irregularly nodular liver,  massive splenomegaly, and numerous large varices. Small amount of  ascites.  2. Diffuse mesenteric lymphadenopathy, somewhat difficult to distinguish  from patient's varices but thought to be moderate in extent.  3. No definite inflammatory focus or other clearly acute intra-abdominal  or pelvic disease elsewhere.     DICTATED:   05/11/2019  EDITED/ls :   05/11/2019      This report was finalized on 5/11/2019 10:47 PM by DR. Sean Yin MD.       CT Chest Without Contrast [[id number]] Collected:  05/11/19 1230     Updated:  05/11/19 2250    Narrative:       EXAMINATION: CT ABDOMEN/PELVIS WO CONTRAST, CT CHEST WO CONTRAST -  05/11/2019      INDICATION: A41.9-Sepsis, unspecified organism; F19.90-Other  psychoactive substance use, unspecified, uncomplicated.     TECHNIQUE: Spiral acquisition 5 mm unenhanced images through the chest,   abdomen and pelvis.     The radiation dose reduction device was turned  on for each scan per the  ALARA (As Low as Reasonably Achievable) protocol.     COMPARISON: 03/23/2018 ultrasound.     FINDINGS: Previous exam report indicates minimal ascites, splenomegaly  to approximately 20 cm in length.     CHEST CT SCAN WITHOUT CONTRAST: There is minimal dependent atelectasis  in the posterior lower lobes. No significant pulmonary parenchymal  disease is seen elsewhere. There is no pleural effusion. Mediastinal  window images show no evidence of mass, significant adenopathy, or  pericardial effusion. Some residual thymus gland is present, not unusual  for age. Bony structures appear intact.       Impression:       Minimal dependent atelectasis of the posterior lower lobes.  No clearly acute chest disease is seen.     ABDOMEN AND PELVIS CT SCAN WITHOUT CONTRAST: The liver is small,  markedly irregular and lobular, presumably consisting of multiple  regenerative nodules. The spleen is enlarged to at least 24 x 16.5 x  11.5 cm. No splenic infarct is identified. No splenic hematoma is  appreciated. There are very large varices at the splenic hilum and a  very large recanalized umbilical vein as well as additional numerous  varices throughout the abdomen. On unenhanced images, it is difficult to  distinguish varices from adenopathy. There is suspected to be, however,  moderate diffuse mesenteric lymphadenopathy.     Pancreas, adrenal glands and kidneys appear unremarkable. Gallbladder is  not clearly identified, either contracted or absent. No intra-abdominal  free air is seen. There is trace ascites. Bowel loops are normal in  caliber. No bowel wall edema or other evidence of inflammation is seen.  Uterus and ovaries appear small. Bony structures appear to be intact.     IMPRESSION:  1. Advanced changes of cirrhosis with small irregularly nodular liver,  massive splenomegaly, and numerous large varices. Small amount of  ascites.  2. Diffuse mesenteric lymphadenopathy, somewhat difficult to  distinguish  from patient's varices but thought to be moderate in extent.  3. No definite inflammatory focus or other clearly acute intra-abdominal  or pelvic disease elsewhere.     DICTATED:   05/11/2019  EDITED/ls :   05/11/2019      This report was finalized on 5/11/2019 10:47 PM by DR. Sean Yin MD.       CT Head Without Contrast [[id number]] Collected:  05/11/19 0057     Updated:  05/11/19 0059    Narrative:       CT Head WO    HISTORY:   Severe headache for several days.    TECHNIQUE:   Axial unenhanced head CT. Radiation dose reduction techniques included automated exposure control or exposure modulation based on body size. Count of known CT and cardiac nuc med studies performed in previous 12 months: 0.     Time of scan: 0046 hours    COMPARISON:   3/17/2009    FINDINGS:   No intracranial hemorrhage, mass, or infarct. No hydrocephalus or extra-axial fluid collection. Brain parenchymal density is normal. The skull base, calvarium, and extracranial soft tissues are normal.      Impression:       Normal, negative unenhanced head CT.          Signer Name: ITZEL Marte MD   Signed: 5/11/2019 12:57 AM   Workstation Name: [id number]       XR Chest 1 View [[id number]] Collected:  05/11/19 0042     Updated:  05/11/19 0044    Narrative:       CR Chest 1 Vw    INDICATION:   Fatigue and chills; current smoker.     COMPARISON:    3/23/2018    FINDINGS:  Single portable AP view of the chest.  The heart and mediastinal contours are normal. The lungs are clear. No pneumothorax or pleural effusion.      Impression:       No acute cardiopulmonary findings.    Signer Name: ITZEL Marte MD   Signed: 5/11/2019 12:42 AM   Workstation Name: [id number]       XR Hand 3+ View Left [[id number]] Collected:  05/11/19 0041     Updated:  05/11/19 0044    Narrative:       CR Hand Min 3 Vws LT    INDICATION:   Complains of left hand pain. Patient using IV drugs and missed.    COMPARISON:   None available.    FINDINGS:   3 views  of the left hand.  No fracture or dislocation.  No bone erosion or destruction.  Mild soft tissue swelling over the dorsum of hand. No radiopaque foreign body. No soft tissue gas.      Impression:       Nonspecific dorsal and soft tissue swelling.    Signer Name: ITZEL Marte MD   Signed: 5/11/2019 12:41 AM   Workstation Name: [id number]             Results for orders placed during the hospital encounter of 05/10/19   Duplex Venous Upper Extremity - Left CAR    Narrative · Normal left upper extremity venous duplex scan.          Results for orders placed during the hospital encounter of 05/10/19   Duplex Venous Upper Extremity - Left CAR    Narrative · Normal left upper extremity venous duplex scan.          Results for orders placed during the hospital encounter of 05/10/19   Adult Transthoracic Echo Complete W/ Cont if Necessary Per Protocol    Narrative · Left ventricular wall thickness is consistent with mild concentric   hypertrophy.  · Left atrial cavity size is mild-to-moderately dilated.  · Estimated EF = 62%.  · Left ventricular systolic function is normal.  · Left ventricular diastolic function is normal.  · Normal right ventricular cavity size, wall thickness, systolic function   and septal motion noted.  · No evidence of pulmonary hypertension is present.  · There is no evidence of pericardial effusion.  · No significant structural valvular abnormality demonstrated.           Order Current Status    Hemoglobin A1C With EMG In process    Blood Culture - Blood, Arm, Left Preliminary result    Blood Culture - Blood, Arm, Right Preliminary result        Discharge Details     Discharge Disposition:  **Patient left AMA prior to completion of evaluation and management, therefore discharge planning remains incomplete including absence of any needed discharging medications, testing arrangements or follow up unless otherwise specified**      No future appointments.        Electronically signed by  Jonah Tracy MD, 05/12/19, 5:24 PM.

## 2019-05-13 NOTE — SIGNIFICANT NOTE
I has just received a call from microbiology about a positive blood culture. Patient reportedly eloped yesterday. I attempted to reach her on her phone but could not. There is no voice mail either. I have informed Aranza.

## 2019-05-14 LAB
BACTERIA SPEC AEROBE CULT: ABNORMAL
EST. AVERAGE GLUCOSE BLD GHB EST-MCNC: <74 MG/DL
GRAM STN SPEC: ABNORMAL
HBA1C MFR BLD: <4.2 % (ref 4.8–5.6)
ISOLATED FROM: ABNORMAL

## 2019-05-16 LAB — BACTERIA SPEC AEROBE CULT: NORMAL

## 2020-01-01 ENCOUNTER — APPOINTMENT (OUTPATIENT)
Dept: INFUSION THERAPY | Facility: HOSPITAL | Age: 30
End: 2020-01-01

## 2020-01-01 ENCOUNTER — HOSPITAL ENCOUNTER (OUTPATIENT)
Dept: INFUSION THERAPY | Facility: HOSPITAL | Age: 30
Setting detail: INFUSION SERIES
Discharge: HOME | End: 2020-12-07

## 2020-01-01 ENCOUNTER — TELEPHONE (OUTPATIENT)
Dept: INTERNAL MEDICINE | Facility: CLINIC | Age: 30
End: 2020-01-01

## 2020-01-01 ENCOUNTER — HOSPITAL ENCOUNTER (OUTPATIENT)
Dept: INFUSION THERAPY | Facility: HOSPITAL | Age: 30
Discharge: HOME | End: 2020-11-25
Admitting: FAMILY MEDICINE

## 2020-01-01 ENCOUNTER — OFFICE VISIT (OUTPATIENT)
Dept: INTERNAL MEDICINE | Facility: CLINIC | Age: 30
End: 2020-01-01

## 2020-01-01 VITALS
DIASTOLIC BLOOD PRESSURE: 40 MMHG | SYSTOLIC BLOOD PRESSURE: 104 MMHG | HEIGHT: 63 IN | HEART RATE: 109 BPM | OXYGEN SATURATION: 100 % | WEIGHT: 179.6 LBS | BODY MASS INDEX: 31.82 KG/M2 | TEMPERATURE: 98 F

## 2020-01-01 VITALS
HEART RATE: 63 BPM | OXYGEN SATURATION: 100 % | RESPIRATION RATE: 18 BRPM | HEIGHT: 63 IN | BODY MASS INDEX: 29.23 KG/M2 | WEIGHT: 165 LBS | TEMPERATURE: 97.3 F | SYSTOLIC BLOOD PRESSURE: 112 MMHG | DIASTOLIC BLOOD PRESSURE: 65 MMHG

## 2020-01-01 VITALS
RESPIRATION RATE: 18 BRPM | HEART RATE: 65 BPM | TEMPERATURE: 97.7 F | SYSTOLIC BLOOD PRESSURE: 150 MMHG | OXYGEN SATURATION: 100 % | DIASTOLIC BLOOD PRESSURE: 82 MMHG

## 2020-01-01 DIAGNOSIS — K72.10 END STAGE LIVER DISEASE (HCC): ICD-10-CM

## 2020-01-01 DIAGNOSIS — D63.1 ANEMIA DUE TO STAGE 4 CHRONIC KIDNEY DISEASE (HCC): ICD-10-CM

## 2020-01-01 DIAGNOSIS — F41.9 ANXIETY: ICD-10-CM

## 2020-01-01 DIAGNOSIS — D69.6 THROMBOCYTOPENIA (HCC): ICD-10-CM

## 2020-01-01 DIAGNOSIS — D63.1 ANEMIA DUE TO STAGE 4 CHRONIC KIDNEY DISEASE (HCC): Primary | ICD-10-CM

## 2020-01-01 DIAGNOSIS — Z72.0 TOBACCO USE: ICD-10-CM

## 2020-01-01 DIAGNOSIS — Z30.9 ENCOUNTER FOR CONTRACEPTIVE MANAGEMENT, UNSPECIFIED TYPE: ICD-10-CM

## 2020-01-01 DIAGNOSIS — D70.9 NEUTROPENIA, UNSPECIFIED TYPE (HCC): ICD-10-CM

## 2020-01-01 DIAGNOSIS — B18.2 CHRONIC HEPATITIS C WITHOUT HEPATIC COMA (HCC): ICD-10-CM

## 2020-01-01 DIAGNOSIS — N18.4 ANEMIA DUE TO STAGE 4 CHRONIC KIDNEY DISEASE (HCC): Primary | ICD-10-CM

## 2020-01-01 DIAGNOSIS — E87.6 HYPOKALEMIA: ICD-10-CM

## 2020-01-01 DIAGNOSIS — R23.3 PETECHIAL RASH: Primary | ICD-10-CM

## 2020-01-01 DIAGNOSIS — I95.2 HYPOTENSION DUE TO DRUGS: ICD-10-CM

## 2020-01-01 DIAGNOSIS — R53.83 FATIGUE, UNSPECIFIED TYPE: ICD-10-CM

## 2020-01-01 DIAGNOSIS — K72.10 CHRONIC LIVER FAILURE WITHOUT HEPATIC COMA (HCC): ICD-10-CM

## 2020-01-01 DIAGNOSIS — F19.10 POLYSUBSTANCE ABUSE (HCC): ICD-10-CM

## 2020-01-01 DIAGNOSIS — Z13.220 LIPID SCREENING: ICD-10-CM

## 2020-01-01 DIAGNOSIS — E55.9 VITAMIN D DEFICIENCY: ICD-10-CM

## 2020-01-01 DIAGNOSIS — N18.4 ANEMIA DUE TO STAGE 4 CHRONIC KIDNEY DISEASE (HCC): ICD-10-CM

## 2020-01-01 LAB
25(OH)D3+25(OH)D2 SERPL-MCNC: 11.1 NG/ML (ref 30–100)
ABO GROUP BLD: NORMAL
ALBUMIN SERPL-MCNC: 2.4 G/DL (ref 3.5–5.2)
ALBUMIN/GLOB SERPL: 0.8 G/DL
ALP SERPL-CCNC: 303 U/L (ref 39–117)
ALT SERPL-CCNC: 28 U/L (ref 1–33)
AST SERPL-CCNC: 46 U/L (ref 1–32)
BASOPHILS # BLD AUTO: ABNORMAL 10*3/UL
BASOPHILS # BLD MANUAL: 0.02 10*3/MM3 (ref 0–0.2)
BASOPHILS NFR BLD MANUAL: 1.1 % (ref 0–1.5)
BH BB BLOOD EXPIRATION DATE: NORMAL
BH BB BLOOD EXPIRATION DATE: NORMAL
BH BB BLOOD TYPE BARCODE: 5100
BH BB BLOOD TYPE BARCODE: 5100
BH BB DISPENSE STATUS: NORMAL
BH BB DISPENSE STATUS: NORMAL
BH BB PRODUCT CODE: NORMAL
BH BB PRODUCT CODE: NORMAL
BH BB UNIT NUMBER: NORMAL
BH BB UNIT NUMBER: NORMAL
BILIRUB SERPL-MCNC: 11.6 MG/DL (ref 0–1.2)
BLD GP AB SCN SERPL QL: NEGATIVE
BLD GP AB SCN SERPL QL: NEGATIVE
BUN SERPL-MCNC: 77 MG/DL (ref 6–20)
BUN SERPL-MCNC: 95 MG/DL (ref 6–20)
BUN/CREAT SERPL: 32 (ref 7–25)
BUN/CREAT SERPL: 38.5 (ref 7–25)
CALCIUM SERPL-MCNC: 7.6 MG/DL (ref 8.6–10.5)
CALCIUM SERPL-MCNC: 8.2 MG/DL (ref 8.6–10.5)
CHLORIDE SERPL-SCNC: 106 MMOL/L (ref 98–107)
CHLORIDE SERPL-SCNC: 107 MMOL/L (ref 98–107)
CHOLEST SERPL-MCNC: 87 MG/DL (ref 0–200)
CO2 SERPL-SCNC: 18.1 MMOL/L (ref 22–29)
CO2 SERPL-SCNC: 21.3 MMOL/L (ref 22–29)
CREAT SERPL-MCNC: 2 MG/DL (ref 0.57–1)
CREAT SERPL-MCNC: 2.97 MG/DL (ref 0.57–1)
CROSSMATCH INTERPRETATION: NORMAL
CROSSMATCH INTERPRETATION: NORMAL
DIFFERENTIAL COMMENT: ABNORMAL
EOSINOPHIL # BLD AUTO: ABNORMAL 10*3/UL
EOSINOPHIL # BLD MANUAL: 0.03 10*3/MM3 (ref 0–0.4)
EOSINOPHIL NFR BLD AUTO: ABNORMAL %
EOSINOPHIL NFR BLD MANUAL: 2.2 % (ref 0.3–6.2)
ERYTHROCYTE [DISTWIDTH] IN BLOOD BY AUTOMATED COUNT: 17.7 % (ref 12.3–15.4)
FOLATE SERPL-MCNC: >20 NG/ML (ref 4.78–24.2)
GLOBULIN SER CALC-MCNC: 3.2 GM/DL
GLUCOSE SERPL-MCNC: 155 MG/DL (ref 65–99)
GLUCOSE SERPL-MCNC: 96 MG/DL (ref 65–99)
HCT VFR BLD AUTO: 20.1 % (ref 34–46.6)
HCT VFR BLD AUTO: 20.3 % (ref 34–46.6)
HCT VFR BLD AUTO: 24.1 % (ref 34–46.6)
HDLC SERPL-MCNC: 7 MG/DL (ref 40–60)
HGB BLD-MCNC: 6.6 G/DL (ref 12–15.9)
HGB BLD-MCNC: 7 G/DL (ref 12–15.9)
HGB BLD-MCNC: 7.6 G/DL (ref 12–15.9)
LDLC SERPL CALC-MCNC: 46 MG/DL (ref 0–100)
LYMPHOCYTES # BLD AUTO: ABNORMAL 10*3/UL
LYMPHOCYTES # BLD MANUAL: 0.72 10*3/MM3 (ref 0.7–3.1)
LYMPHOCYTES NFR BLD AUTO: ABNORMAL %
LYMPHOCYTES NFR BLD MANUAL: 46.7 % (ref 19.6–45.3)
MCH RBC QN AUTO: 28.6 PG (ref 26.6–33)
MCHC RBC AUTO-ENTMCNC: 34.5 G/DL (ref 31.5–35.7)
MCV RBC AUTO: 82.9 FL (ref 79–97)
MONOCYTES # BLD MANUAL: 0.1 10*3/MM3 (ref 0.1–0.9)
MONOCYTES NFR BLD AUTO: ABNORMAL %
MONOCYTES NFR BLD MANUAL: 6.7 % (ref 5–12)
NEUTROPHILS # BLD MANUAL: 0.67 10*3/MM3 (ref 1.7–7)
NEUTROPHILS NFR BLD AUTO: ABNORMAL %
NEUTROPHILS NFR BLD MANUAL: 43.3 % (ref 42.7–76)
PLATELET # BLD AUTO: 59 10*3/MM3 (ref 140–450)
PLATELET BLD QL SMEAR: ABNORMAL
POTASSIUM SERPL-SCNC: 3 MMOL/L (ref 3.5–5.2)
POTASSIUM SERPL-SCNC: 4.5 MMOL/L (ref 3.5–5.2)
PROT SERPL-MCNC: 5.6 G/DL (ref 6–8.5)
RBC # BLD AUTO: 2.45 10*6/MM3 (ref 3.77–5.28)
RBC MORPH BLD: ABNORMAL
RH BLD: POSITIVE
SODIUM SERPL-SCNC: 136 MMOL/L (ref 136–145)
SODIUM SERPL-SCNC: 139 MMOL/L (ref 136–145)
T&S EXPIRATION DATE: NORMAL
T&S EXPIRATION DATE: NORMAL
TRIGL SERPL-MCNC: 207 MG/DL (ref 0–150)
TSH SERPL DL<=0.005 MIU/L-ACNC: 2.6 UIU/ML (ref 0.27–4.2)
UNIT  ABO: NORMAL
UNIT  ABO: NORMAL
UNIT  RH: NORMAL
UNIT  RH: NORMAL
VIT B12 SERPL-MCNC: 1551 PG/ML (ref 211–946)
VLDLC SERPL CALC-MCNC: 34 MG/DL (ref 5–40)
WBC # BLD AUTO: 1.54 10*3/MM3 (ref 3.4–10.8)

## 2020-01-01 PROCEDURE — 36415 COLL VENOUS BLD VENIPUNCTURE: CPT

## 2020-01-01 PROCEDURE — 85018 HEMOGLOBIN: CPT | Performed by: FAMILY MEDICINE

## 2020-01-01 PROCEDURE — 86850 RBC ANTIBODY SCREEN: CPT | Performed by: FAMILY MEDICINE

## 2020-01-01 PROCEDURE — P9016 RBC LEUKOCYTES REDUCED: HCPCS

## 2020-01-01 PROCEDURE — 85014 HEMATOCRIT: CPT | Performed by: FAMILY MEDICINE

## 2020-01-01 PROCEDURE — 63710000001 ACETAMINOPHEN 325 MG TABLET: Performed by: FAMILY MEDICINE

## 2020-01-01 PROCEDURE — 86900 BLOOD TYPING SEROLOGIC ABO: CPT | Performed by: FAMILY MEDICINE

## 2020-01-01 PROCEDURE — 86901 BLOOD TYPING SEROLOGIC RH(D): CPT

## 2020-01-01 PROCEDURE — 86900 BLOOD TYPING SEROLOGIC ABO: CPT

## 2020-01-01 PROCEDURE — 36430 TRANSFUSION BLD/BLD COMPNT: CPT

## 2020-01-01 PROCEDURE — 86901 BLOOD TYPING SEROLOGIC RH(D): CPT | Performed by: FAMILY MEDICINE

## 2020-01-01 PROCEDURE — 99204 OFFICE O/P NEW MOD 45 MIN: CPT | Performed by: FAMILY MEDICINE

## 2020-01-01 PROCEDURE — 86920 COMPATIBILITY TEST SPIN: CPT

## 2020-01-01 PROCEDURE — A9270 NON-COVERED ITEM OR SERVICE: HCPCS | Performed by: FAMILY MEDICINE

## 2020-01-01 PROCEDURE — 63710000001 DIPHENHYDRAMINE PER 50 MG: Performed by: FAMILY MEDICINE

## 2020-01-01 RX ORDER — MULTIPLE VITAMINS W/ MINERALS TAB 9MG-400MCG
1 TAB ORAL DAILY
COMMUNITY

## 2020-01-01 RX ORDER — TRIAMCINOLONE ACETONIDE 5 MG/G
OINTMENT TOPICAL 3 TIMES DAILY
Qty: 15 G | Refills: 2 | Status: SHIPPED | OUTPATIENT
Start: 2020-01-01 | End: 2021-01-01 | Stop reason: SDDI

## 2020-01-01 RX ORDER — DIPHENHYDRAMINE HCL 25 MG
25 CAPSULE ORAL ONCE
Status: CANCELLED | OUTPATIENT
Start: 2020-01-01

## 2020-01-01 RX ORDER — NIFEDIPINE 30 MG/1
30 TABLET, EXTENDED RELEASE ORAL DAILY
COMMUNITY
End: 2021-01-01 | Stop reason: SDDI

## 2020-01-01 RX ORDER — ERGOCALCIFEROL 1.25 MG/1
50000 CAPSULE ORAL WEEKLY
Qty: 5 CAPSULE | Refills: 5 | Status: SHIPPED | OUTPATIENT
Start: 2020-01-01 | End: 2021-01-01 | Stop reason: SDUPTHER

## 2020-01-01 RX ORDER — SODIUM CHLORIDE 9 MG/ML
250 INJECTION, SOLUTION INTRAVENOUS AS NEEDED
Status: DISCONTINUED | OUTPATIENT
Start: 2020-01-01 | End: 2020-01-01 | Stop reason: HOSPADM

## 2020-01-01 RX ORDER — FAMOTIDINE 20 MG/1
20 TABLET, FILM COATED ORAL DAILY
COMMUNITY

## 2020-01-01 RX ORDER — ACETAMINOPHEN 325 MG/1
650 TABLET ORAL ONCE
Status: COMPLETED | OUTPATIENT
Start: 2020-01-01 | End: 2020-01-01

## 2020-01-01 RX ORDER — DIPHENHYDRAMINE HCL 25 MG
25 CAPSULE ORAL ONCE
Status: COMPLETED | OUTPATIENT
Start: 2020-01-01 | End: 2020-01-01

## 2020-01-01 RX ORDER — POTASSIUM CHLORIDE 750 MG/1
TABLET, EXTENDED RELEASE ORAL
Qty: 30 TABLET | Refills: 5 | Status: SHIPPED | OUTPATIENT
Start: 2020-01-01 | End: 2021-01-01

## 2020-01-01 RX ORDER — CHOLESTYRAMINE LIGHT 4 G/5.7G
4 POWDER, FOR SUSPENSION ORAL 2 TIMES DAILY
COMMUNITY

## 2020-01-01 RX ORDER — HYDROXYZINE PAMOATE 25 MG/1
25 CAPSULE ORAL 3 TIMES DAILY PRN
Qty: 90 CAPSULE | Refills: 5 | Status: SHIPPED | OUTPATIENT
Start: 2020-01-01 | End: 2021-01-01 | Stop reason: SDUPTHER

## 2020-01-01 RX ORDER — FUROSEMIDE 40 MG/1
60 TABLET ORAL 2 TIMES DAILY PRN
COMMUNITY
End: 2021-01-01 | Stop reason: SDUPTHER

## 2020-01-01 RX ORDER — FUROSEMIDE 10 MG/ML
20 INJECTION INTRAMUSCULAR; INTRAVENOUS ONCE
Status: DISCONTINUED | OUTPATIENT
Start: 2020-01-01 | End: 2020-01-01 | Stop reason: HOSPADM

## 2020-01-01 RX ORDER — HYDROXYZINE PAMOATE 25 MG/1
25 CAPSULE ORAL 3 TIMES DAILY PRN
COMMUNITY
End: 2020-01-01 | Stop reason: SDUPTHER

## 2020-01-01 RX ORDER — ACETAMINOPHEN 325 MG/1
650 TABLET ORAL EVERY 6 HOURS PRN
COMMUNITY
End: 2021-01-01

## 2020-01-01 RX ORDER — ACETAMINOPHEN 325 MG/1
650 TABLET ORAL ONCE
Status: CANCELLED | OUTPATIENT
Start: 2020-01-01

## 2020-01-01 RX ORDER — URSODIOL 500 MG/1
500 TABLET, FILM COATED ORAL 2 TIMES DAILY
COMMUNITY
End: 2021-01-01

## 2020-01-01 RX ORDER — METHADONE HYDROCHLORIDE 10 MG/1
80 TABLET ORAL ONCE
COMMUNITY

## 2020-01-01 RX ORDER — DIPHENHYDRAMINE HCL 25 MG
25 CAPSULE ORAL ONCE
Status: DISCONTINUED | OUTPATIENT
Start: 2020-01-01 | End: 2020-01-01 | Stop reason: HOSPADM

## 2020-01-01 RX ORDER — POTASSIUM CHLORIDE 750 MG/1
10 TABLET, EXTENDED RELEASE ORAL DAILY
Qty: 30 TABLET | Refills: 0 | Status: SHIPPED | OUTPATIENT
Start: 2020-01-01 | End: 2020-01-01

## 2020-01-01 RX ORDER — CEPHALEXIN 500 MG/1
CAPSULE ORAL
COMMUNITY
Start: 2020-01-01 | End: 2020-01-01

## 2020-01-01 RX ORDER — SODIUM BICARBONATE 650 MG/1
650 TABLET ORAL 3 TIMES DAILY
COMMUNITY

## 2020-01-01 RX ORDER — SODIUM CHLORIDE 9 MG/ML
250 INJECTION, SOLUTION INTRAVENOUS AS NEEDED
Status: CANCELLED | OUTPATIENT
Start: 2020-01-01

## 2020-01-01 RX ORDER — SIMETHICONE 80 MG
80 TABLET,CHEWABLE ORAL EVERY 6 HOURS PRN
COMMUNITY

## 2020-01-01 RX ORDER — MELATONIN 10 MG
1 CAPSULE ORAL NIGHTLY
COMMUNITY

## 2020-01-01 RX ADMIN — DIPHENHYDRAMINE HYDROCHLORIDE 25 MG: 25 CAPSULE ORAL at 13:44

## 2020-01-01 RX ADMIN — ACETAMINOPHEN 650 MG: 325 TABLET, FILM COATED ORAL at 13:50

## 2020-01-01 RX ADMIN — ACETAMINOPHEN 650 MG: 325 TABLET, FILM COATED ORAL at 11:51

## 2020-01-01 RX ADMIN — DIPHENHYDRAMINE HYDROCHLORIDE 25 MG: 25 CAPSULE ORAL at 11:51

## 2020-11-10 PROBLEM — I95.2 HYPOTENSION DUE TO DRUGS: Status: ACTIVE | Noted: 2020-01-01

## 2020-11-10 NOTE — PROGRESS NOTES
Lindsey Aldridge is a 30 y.o. female.    Chief Complaint   Patient presents with   • Liver Eval   • Rash   • Edema       HPI   Patient presents today to establish care.  Patient complains of redness and swelling to right leg 1 week ago. She was given keflex by her GI specialiast 3 times daily for the last 7 days.  She reports having cellulitis in the past.  The redness has improved, but now has a speckled rash to legs that does not itch.    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  She does have a history of polysubstance abuse.  She reports sobriety and is in a methadone clinic currently.  She was recently discharged from  due to complications of cirrhosis.  During her hospitalization she was hypokalemic and is on oral replacement.  She was treated for hypertension while there as well and nifedipine was decreased on discharge.  Currently she is hypotensive.  She was also treat for acute on chronic renal failure.  She does have several specialists at  including hepatology and nephrology.      Patient is wanting to start birth control.  She has not seen gynecology in quite some time.  She does have thrombocytopenia, but is also a smoker.    Patient does have anxiety and is taking zoloft and hydroxyzine with with some improvement.  She does continue to have feelings of nervousness and worry.  She is motivated to turn her life around so she can obtain custody of her son.    Patient does have vitamin deficiencies as well and is on oral supplements.        The following portions of the patient's history were reviewed and updated as appropriate: allergies, current medications, past family history, past medical history, past social history, past surgical history and problem list.     Past Medical History:   Diagnosis Date   • Biliary atresia    • Hepatitis C    • Liver failure (CMS/HCC)    • Renal disorder    • Splenic artery injury    •  Splenic infarction        Past Surgical History:   Procedure Laterality Date   •  SECTION         Family History   Problem Relation Age of Onset   • Heart disease Father        Social History     Socioeconomic History   • Marital status: Single     Spouse name: Not on file   • Number of children: Not on file   • Years of education: Not on file   • Highest education level: Not on file   Tobacco Use   • Smoking status: Current Every Day Smoker     Packs/day: 1.00     Types: Cigarettes   • Smokeless tobacco: Never Used   Substance and Sexual Activity   • Alcohol use: Yes     Comment: OCCASIONALLY   • Drug use: Yes     Types: IV, Benzodiazepines, Heroin     Comment: HEROIN AND FENTANYL - LAST USE 5/10/19 2300   • Sexual activity: Defer       Allergies   Allergen Reactions   • Aspirin Other (See Comments)     DETERIOTES BILE DUCTS         Current Outpatient Medications:   •  acetaminophen (TYLENOL) 325 MG tablet, Take 650 mg by mouth Every 6 (Six) Hours As Needed for Mild Pain ., Disp: , Rfl:   •  cholestyramine light 4 g packet, Take 4 g by mouth 2 (Two) Times a Day., Disp: , Rfl:   •  famotidine (PEPCID) 20 MG tablet, Take 20 mg by mouth Daily., Disp: , Rfl:   •  furosemide (LASIX) 40 MG tablet, Take 60 mg by mouth 2 (Two) Times a Day As Needed., Disp: , Rfl:   •  hydrOXYzine pamoate (VISTARIL) 25 MG capsule, Take 1 capsule by mouth 3 (Three) Times a Day As Needed for Itching or Anxiety., Disp: 90 capsule, Rfl: 5  •  Melatonin 10 MG capsule, Take 1 each by mouth Every Night., Disp: , Rfl:   •  methadone (DOLOPHINE) 10 MG tablet, Take 60 mg by mouth 1 (One) Time,, Disp: , Rfl:   •  multivitamin with minerals (MULTIVITAMIN ADULT PO), Take 1 tablet by mouth Daily., Disp: , Rfl:   •  NIFEdipine XL (PROCARDIA XL) 30 MG 24 hr tablet, Take 30 mg by mouth Daily., Disp: , Rfl:   •  sertraline (ZOLOFT) 50 MG tablet, Take 50 mg by mouth Daily., Disp: , Rfl:   •  simethicone (MYLICON) 80 MG chewable tablet, Chew 80 mg  "Every 6 (Six) Hours As Needed for Flatulence., Disp: , Rfl:   •  sodium bicarbonate 650 MG tablet, Take 650 mg by mouth 3 (Three) Times a Day., Disp: , Rfl:   •  ursodiol (ACTIGALL) 500 MG tablet, Take 500 mg by mouth 2 (two) times a day., Disp: , Rfl:   •  Zinc 50 MG capsule, Take  by mouth., Disp: , Rfl:   •  lamoTRIgine (LAMICTAL PO), Take  by mouth., Disp: , Rfl:   •  potassium chloride (K-DUR,KLOR-CON) 10 MEQ CR tablet, Take 1 tablet by mouth Daily., Disp: 30 tablet, Rfl: 0  •  triamcinolone (KENALOG) 0.5 % ointment, Apply  topically to the appropriate area as directed 3 (Three) Times a Day., Disp: 15 g, Rfl: 2  •  vitamin D (ERGOCALCIFEROL) 1.25 MG (80101 UT) capsule capsule, Take 1 capsule by mouth 1 (One) Time Per Week., Disp: 5 capsule, Rfl: 5    ROS    Review of Systems   Constitutional: Positive for fatigue. Negative for chills and fever.   HENT: Negative for congestion and rhinorrhea.    Respiratory: Positive for shortness of breath. Negative for cough and wheezing.    Cardiovascular: Negative for chest pain.   Gastrointestinal: Negative for abdominal pain, constipation, diarrhea, nausea and vomiting.   Musculoskeletal: Positive for arthralgias.   Skin: Positive for rash.   Neurological: Positive for weakness and headache.   Psychiatric/Behavioral: Positive for depressed mood. The patient is nervous/anxious.        Vitals:    11/10/20 1357   BP: 104/40   BP Location: Left arm   Patient Position: Sitting   Cuff Size: Adult   Pulse: 109   Temp: 98 °F (36.7 °C)   TempSrc: Temporal   SpO2: 100%   Weight: 81.5 kg (179 lb 9.6 oz)   Height: 160 cm (63\")     Body mass index is 31.81 kg/m².    Physical Exam     Physical Exam  Constitutional:       General: She is not in acute distress.     Appearance: She is well-developed. She is ill-appearing.   HENT:      Head: Normocephalic and atraumatic.      Right Ear: External ear normal.      Left Ear: External ear normal.   Eyes:      General: Scleral icterus present. "      Extraocular Movements: Extraocular movements intact.      Pupils: Pupils are equal, round, and reactive to light.   Neck:      Musculoskeletal: Normal range of motion and neck supple.      Vascular: JVD present.   Cardiovascular:      Rate and Rhythm: Regular rhythm. Tachycardia present.      Heart sounds: No murmur.   Pulmonary:      Effort: Pulmonary effort is normal. No respiratory distress.      Breath sounds: Normal breath sounds. No wheezing.   Abdominal:      General: Bowel sounds are normal. There is no distension.      Palpations: Abdomen is soft.      Tenderness: There is no abdominal tenderness.   Musculoskeletal:      Right lower leg: Edema present.      Left lower leg: Edema present.   Lymphadenopathy:      Cervical: No cervical adenopathy.   Skin:     General: Skin is warm and dry.      Coloration: Skin is jaundiced and mottled (lower extremities).      Findings: Rash (petechiae to thighs bilaterally) present.   Neurological:      Mental Status: She is alert and oriented to person, place, and time.      Cranial Nerves: No cranial nerve deficit.   Psychiatric:         Mood and Affect: Mood normal.         Behavior: Behavior normal.         Assessment/Plan    Problems Addressed this Visit        Cardiovascular and Mediastinum    Hypotension due to drugs     Likely secondary to overuse of medication.  Encouraged to take nifedipine as prescribed.           Relevant Orders    CBC & Differential (Completed)    Comprehensive Metabolic Panel (Completed)       Digestive    Hepatitis C     Continue to follow up with hepatology at .           Liver failure (CMS/HCC)     Continue current medication and follow up with  hepatology.           Relevant Orders    Comprehensive Metabolic Panel (Completed)    Vitamin D deficiency    Relevant Orders    Vitamin D 25 Hydroxy (Completed)       Musculoskeletal and Integument    Petechial rash - Primary     Likely secondary to resolution of recent swelling along with  thrombocytopenia.  Advised cellulitis seems to have resolved and do not feel that she need another round of antibiotics.           Relevant Medications    triamcinolone (KENALOG) 0.5 % ointment       Hematopoietic and Hemostatic    Thrombocytopenia (CMS/HCC)     Secondary to cirrhosis.  Will monitor labs regularly.          Relevant Orders    CBC & Differential (Completed)       Other    Polysubstance abuse (CMS/HCC)     Continue with methadone clinic.  Discussed decreased methadone dosage on discharge.           Anxiety     Improved with current medication.  Continue zoloft and hydroxyzine.             Other Visit Diagnoses     Tobacco use        Encounter for contraceptive management, unspecified type        Relevant Orders    Ambulatory Referral to Gynecology    Fatigue, unspecified type        Relevant Orders    Vitamin B12 (Completed)    Folate (Completed)    TSH (Completed)    Lipid screening        Relevant Orders    Lipid Panel (Completed)          New Medications Ordered This Visit   Medications   • vitamin D (ERGOCALCIFEROL) 1.25 MG (86169 UT) capsule capsule     Sig: Take 1 capsule by mouth 1 (One) Time Per Week.     Dispense:  5 capsule     Refill:  5   • hydrOXYzine pamoate (VISTARIL) 25 MG capsule     Sig: Take 1 capsule by mouth 3 (Three) Times a Day As Needed for Itching or Anxiety.     Dispense:  90 capsule     Refill:  5   • triamcinolone (KENALOG) 0.5 % ointment     Sig: Apply  topically to the appropriate area as directed 3 (Three) Times a Day.     Dispense:  15 g     Refill:  2       No orders of the defined types were placed in this encounter.      Return in about 2 months (around 1/10/2021) for Medicare Wellness.      Darlin Arana DO

## 2020-11-10 NOTE — PATIENT INSTRUCTIONS
IF YOU SMOKE OR USE TOBACCO PLEASE READ THE FOLLOWING:  Why is smoking bad for me?  Smoking increases the risk of heart disease, lung disease, vascular disease, stroke, and cancer. If you smoke, STOP!    For more information:  Quit Now [place]  1-800-QUIT-NOW  https://[place].quitlogix.org/en-[place]/

## 2020-11-22 PROBLEM — F41.9 ANXIETY: Status: ACTIVE | Noted: 2020-01-01

## 2020-11-22 PROBLEM — E55.9 VITAMIN D DEFICIENCY: Status: ACTIVE | Noted: 2020-01-01

## 2020-11-22 PROBLEM — R23.3 PETECHIAL RASH: Status: ACTIVE | Noted: 2020-01-01

## 2020-11-23 NOTE — ASSESSMENT & PLAN NOTE
Likely secondary to resolution of recent swelling along with thrombocytopenia.  Advised cellulitis seems to have resolved and do not feel that she need another round of antibiotics.

## 2020-11-24 NOTE — TELEPHONE ENCOUNTER
Received critical H&H from lab at approx 1:23 am, 6.6 and 20.1.    Looked at chart, known anemia. Due to time planned to call at 6 am.     Further review of chart at 6 am showed hemoglobin 7 two weeks ago.     As this is not a new/sudden change will defer to PCP this am, as I'm not sure if there is a plan in place regarding next steps (ER? Transfusion discussion?)    Will message both PCP and PCP's MA.

## 2020-11-25 NOTE — CODE DOCUMENTATION
Contacted dr. santana to inform her that patient told [facility] that she would get to the infusion center when she could. Patient was informed that to get both units of blood, she would need to be here by 0930 at the latest. Patient stated to [facility] that she would do the best she could, but 11 would probably be the earliest she could get in. [facility] informed patient to stay in contact with us to let us know what time she could get to infusion center. Patient did not call or let infusion center know anything before arrival. Per Dr. santana , ok to give one unit of PRBC'S today and the other unit on December 1st. Patient didn't show up for appointment today until 1215 today. Dr. santana notified of care today for patient.

## 2021-01-01 ENCOUNTER — TELEPHONE (OUTPATIENT)
Dept: INTERNAL MEDICINE | Facility: CLINIC | Age: 31
End: 2021-01-01

## 2021-01-01 ENCOUNTER — OFFICE VISIT (OUTPATIENT)
Dept: BEHAVIORAL HEALTH | Facility: CLINIC | Age: 31
End: 2021-01-01

## 2021-01-01 ENCOUNTER — APPOINTMENT (OUTPATIENT)
Dept: CT IMAGING | Facility: HOSPITAL | Age: 31
End: 2021-01-01

## 2021-01-01 ENCOUNTER — HOSPITAL ENCOUNTER (EMERGENCY)
Facility: HOSPITAL | Age: 31
End: 2021-07-26
Attending: EMERGENCY MEDICINE | Admitting: EMERGENCY MEDICINE

## 2021-01-01 ENCOUNTER — OFFICE VISIT (OUTPATIENT)
Dept: INTERNAL MEDICINE | Facility: CLINIC | Age: 31
End: 2021-01-01

## 2021-01-01 ENCOUNTER — OFFICE VISIT (OUTPATIENT)
Dept: PSYCHIATRY | Facility: CLINIC | Age: 31
End: 2021-01-01

## 2021-01-01 ENCOUNTER — APPOINTMENT (OUTPATIENT)
Dept: GENERAL RADIOLOGY | Facility: HOSPITAL | Age: 31
End: 2021-01-01

## 2021-01-01 VITALS
TEMPERATURE: 97.8 F | WEIGHT: 161.8 LBS | HEART RATE: 83 BPM | HEIGHT: 63 IN | BODY MASS INDEX: 28.67 KG/M2 | DIASTOLIC BLOOD PRESSURE: 72 MMHG | SYSTOLIC BLOOD PRESSURE: 112 MMHG | OXYGEN SATURATION: 100 %

## 2021-01-01 VITALS
HEART RATE: 109 BPM | DIASTOLIC BLOOD PRESSURE: 64 MMHG | HEIGHT: 65 IN | OXYGEN SATURATION: 96 % | BODY MASS INDEX: 26.66 KG/M2 | WEIGHT: 160 LBS | TEMPERATURE: 97 F | SYSTOLIC BLOOD PRESSURE: 112 MMHG | RESPIRATION RATE: 20 BRPM

## 2021-01-01 VITALS
RESPIRATION RATE: 18 BRPM | BODY MASS INDEX: 28.7 KG/M2 | DIASTOLIC BLOOD PRESSURE: 78 MMHG | WEIGHT: 162 LBS | HEART RATE: 80 BPM | SYSTOLIC BLOOD PRESSURE: 128 MMHG | TEMPERATURE: 98.2 F | HEIGHT: 63 IN

## 2021-01-01 VITALS
BODY MASS INDEX: 29.23 KG/M2 | DIASTOLIC BLOOD PRESSURE: 86 MMHG | SYSTOLIC BLOOD PRESSURE: 124 MMHG | WEIGHT: 165 LBS | HEART RATE: 79 BPM | HEIGHT: 63 IN | OXYGEN SATURATION: 100 % | TEMPERATURE: 97.6 F | RESPIRATION RATE: 16 BRPM

## 2021-01-01 VITALS
WEIGHT: 168.6 LBS | BODY MASS INDEX: 29.88 KG/M2 | HEIGHT: 63 IN | DIASTOLIC BLOOD PRESSURE: 72 MMHG | SYSTOLIC BLOOD PRESSURE: 114 MMHG

## 2021-01-01 VITALS — BODY MASS INDEX: 28.7 KG/M2 | HEIGHT: 63 IN | WEIGHT: 162 LBS

## 2021-01-01 DIAGNOSIS — E55.9 VITAMIN D DEFICIENCY: Primary | ICD-10-CM

## 2021-01-01 DIAGNOSIS — D63.1 ANEMIA DUE TO STAGE 4 CHRONIC KIDNEY DISEASE: Primary | ICD-10-CM

## 2021-01-01 DIAGNOSIS — J34.89 NASAL LESION: ICD-10-CM

## 2021-01-01 DIAGNOSIS — E55.9 VITAMIN D DEFICIENCY: ICD-10-CM

## 2021-01-01 DIAGNOSIS — D69.6 THROMBOCYTOPENIA (HCC): ICD-10-CM

## 2021-01-01 DIAGNOSIS — J30.9 ALLERGIC RHINITIS, UNSPECIFIED SEASONALITY, UNSPECIFIED TRIGGER: ICD-10-CM

## 2021-01-01 DIAGNOSIS — F19.10 POLYSUBSTANCE ABUSE (HCC): ICD-10-CM

## 2021-01-01 DIAGNOSIS — Z00.00 MEDICARE ANNUAL WELLNESS VISIT, SUBSEQUENT: Primary | ICD-10-CM

## 2021-01-01 DIAGNOSIS — N17.9 ACUTE RENAL FAILURE, UNSPECIFIED ACUTE RENAL FAILURE TYPE (HCC): ICD-10-CM

## 2021-01-01 DIAGNOSIS — L03.119 CELLULITIS OF LOWER EXTREMITY, UNSPECIFIED LATERALITY: ICD-10-CM

## 2021-01-01 DIAGNOSIS — G47.09 OTHER INSOMNIA: ICD-10-CM

## 2021-01-01 DIAGNOSIS — G92.8 TOXIC METABOLIC ENCEPHALOPATHY: ICD-10-CM

## 2021-01-01 DIAGNOSIS — N18.4 ANEMIA DUE TO STAGE 4 CHRONIC KIDNEY DISEASE: Primary | ICD-10-CM

## 2021-01-01 DIAGNOSIS — K72.01 ACUTE LIVER FAILURE WITH HEPATIC COMA (HCC): ICD-10-CM

## 2021-01-01 DIAGNOSIS — F32.0 CURRENT MILD EPISODE OF MAJOR DEPRESSIVE DISORDER, UNSPECIFIED WHETHER RECURRENT (HCC): ICD-10-CM

## 2021-01-01 DIAGNOSIS — K72.10 END STAGE LIVER DISEASE (HCC): ICD-10-CM

## 2021-01-01 DIAGNOSIS — F41.9 ANXIETY: ICD-10-CM

## 2021-01-01 DIAGNOSIS — K92.2 UPPER GI BLEEDING: ICD-10-CM

## 2021-01-01 DIAGNOSIS — A41.9 SEPSIS, DUE TO UNSPECIFIED ORGANISM, UNSPECIFIED WHETHER ACUTE ORGAN DYSFUNCTION PRESENT (HCC): ICD-10-CM

## 2021-01-01 DIAGNOSIS — D64.9 NORMOCYTIC ANEMIA: ICD-10-CM

## 2021-01-01 DIAGNOSIS — R41.82 ALTERED MENTAL STATUS, UNSPECIFIED ALTERED MENTAL STATUS TYPE: ICD-10-CM

## 2021-01-01 DIAGNOSIS — N30.00 ACUTE CYSTITIS WITHOUT HEMATURIA: ICD-10-CM

## 2021-01-01 DIAGNOSIS — J18.9 PNEUMONIA OF BOTH LUNGS DUE TO INFECTIOUS ORGANISM, UNSPECIFIED PART OF LUNG: ICD-10-CM

## 2021-01-01 DIAGNOSIS — K72.10 CHRONIC LIVER FAILURE WITHOUT HEPATIC COMA (HCC): ICD-10-CM

## 2021-01-01 DIAGNOSIS — F33.1 MODERATE EPISODE OF RECURRENT MAJOR DEPRESSIVE DISORDER (HCC): ICD-10-CM

## 2021-01-01 DIAGNOSIS — Z79.899 MEDICATION MANAGEMENT: Primary | ICD-10-CM

## 2021-01-01 DIAGNOSIS — Z32.00 POSSIBLE PREGNANCY: ICD-10-CM

## 2021-01-01 DIAGNOSIS — B18.2 CHRONIC HEPATITIS C WITHOUT HEPATIC COMA (HCC): ICD-10-CM

## 2021-01-01 DIAGNOSIS — D64.9 ANEMIA, UNSPECIFIED TYPE: ICD-10-CM

## 2021-01-01 DIAGNOSIS — D70.9 NEUTROPENIA, UNSPECIFIED TYPE (HCC): ICD-10-CM

## 2021-01-01 DIAGNOSIS — F06.8 ANXIETY DISORDER DUE TO MULTIPLE MEDICAL PROBLEMS: ICD-10-CM

## 2021-01-01 DIAGNOSIS — L03.115 CELLULITIS OF RIGHT LOWER EXTREMITY: Primary | ICD-10-CM

## 2021-01-01 DIAGNOSIS — J96.00 ACUTE RESPIRATORY FAILURE, UNSPECIFIED WHETHER WITH HYPOXIA OR HYPERCAPNIA (HCC): Primary | ICD-10-CM

## 2021-01-01 DIAGNOSIS — F41.9 ANXIETY: Primary | ICD-10-CM

## 2021-01-01 DIAGNOSIS — F32.0 CURRENT MILD EPISODE OF MAJOR DEPRESSIVE DISORDER, UNSPECIFIED WHETHER RECURRENT (HCC): Primary | ICD-10-CM

## 2021-01-01 DIAGNOSIS — D73.5 SPLENIC INFARCTION: ICD-10-CM

## 2021-01-01 LAB
25(OH)D3+25(OH)D2 SERPL-MCNC: 25.3 NG/ML (ref 30–100)
A-A DO2: 245.5 MMHG
ABO GROUP BLD: NORMAL
ALBUMIN SERPL-MCNC: 2.2 G/DL (ref 3.5–5.2)
ALBUMIN SERPL-MCNC: 2.8 G/DL (ref 3.5–5.2)
ALBUMIN/GLOB SERPL: 0.9 G/DL
ALBUMIN/GLOB SERPL: 1 G/DL
ALP SERPL-CCNC: 276 U/L (ref 39–117)
ALP SERPL-CCNC: 533 U/L (ref 39–117)
ALT SERPL W P-5'-P-CCNC: 156 U/L (ref 1–33)
ALT SERPL-CCNC: 45 U/L (ref 1–33)
AMMONIA BLD-SCNC: 116 UMOL/L (ref 11–51)
AMORPH URATE CRY URNS QL MICRO: ABNORMAL /HPF
AMPHET+METHAMPHET UR QL: POSITIVE
AMPHETAMINE CUT-OFF: ABNORMAL
AMPHETAMINES UR QL: POSITIVE
ANION GAP SERPL CALCULATED.3IONS-SCNC: 17.7 MMOL/L (ref 5–15)
APAP SERPL-MCNC: 9.9 MCG/ML (ref 0–30)
ARTERIAL PATENCY WRIST A: ABNORMAL
AST SERPL-CCNC: 254 U/L (ref 1–32)
AST SERPL-CCNC: 52 U/L (ref 1–32)
ATMOSPHERIC PRESS: 734 MMHG
B PARAPERT DNA SPEC QL NAA+PROBE: NOT DETECTED
B PERT DNA SPEC QL NAA+PROBE: NOT DETECTED
B-HCG UR QL: NEGATIVE
B-HCG UR QL: NEGATIVE
BACTERIA SPEC CULT: ABNORMAL
BACTERIA UR QL AUTO: ABNORMAL /HPF
BARBITURATES UR QL SCN: NEGATIVE
BASE EXCESS BLDA CALC-SCNC: -16.9 MMOL/L (ref 0–2)
BASOPHILS # BLD AUTO: ABNORMAL 10*3/UL
BASOPHILS # BLD MANUAL: 0 10*3/MM3 (ref 0–0.2)
BASOPHILS NFR BLD MANUAL: 0 % (ref 0–1.5)
BDY SITE: ABNORMAL
BENZODIAZ UR QL SCN: NEGATIVE
BENZODIAZIPINE CUT-OFF: ABNORMAL
BILIRUB SERPL-MCNC: 1.1 MG/DL (ref 0–1.2)
BILIRUB SERPL-MCNC: 22.9 MG/DL (ref 0–1.2)
BILIRUB UR QL STRIP: ABNORMAL
BLD GP AB SCN SERPL QL: NEGATIVE
BUN SERPL-MCNC: 52 MG/DL (ref 6–20)
BUN SERPL-MCNC: 87 MG/DL (ref 6–20)
BUN/CREAT SERPL: 20.4 (ref 7–25)
BUN/CREAT SERPL: 24 (ref 7–25)
BUPRENORPHINE CUT-OFF: ABNORMAL
BUPRENORPHINE SERPL-MCNC: NEGATIVE NG/ML
C PNEUM DNA NPH QL NAA+NON-PROBE: NOT DETECTED
CALCIUM SERPL-MCNC: 8.2 MG/DL (ref 8.6–10.5)
CALCIUM SPEC-SCNC: 7.2 MG/DL (ref 8.6–10.5)
CANNABINOIDS SERPL QL: POSITIVE
CHLORIDE SERPL-SCNC: 110 MMOL/L (ref 98–107)
CHLORIDE SERPL-SCNC: 117 MMOL/L (ref 98–107)
CK SERPL-CCNC: 65 U/L (ref 20–180)
CLARITY UR: ABNORMAL
CO2 SERPL-SCNC: 17.5 MMOL/L (ref 22–29)
CO2 SERPL-SCNC: 9.3 MMOL/L (ref 22–29)
COCAINE CUT-OFF: ABNORMAL
COCAINE UR QL: NEGATIVE
COHGB MFR BLD: 1.6 % (ref 0–2)
COLOR UR: ABNORMAL
CREAT SERPL-MCNC: 2.17 MG/DL (ref 0.57–1)
CREAT SERPL-MCNC: 4.26 MG/DL (ref 0.57–1)
D-LACTATE SERPL-SCNC: 2 MMOL/L (ref 0.5–2)
D-LACTATE SERPL-SCNC: 4.1 MMOL/L (ref 0.5–2)
DEPRECATED RDW RBC AUTO: 50 FL (ref 37–54)
DIFFERENTIAL COMMENT: ABNORMAL
EOSINOPHIL # BLD AUTO: ABNORMAL 10*3/UL
EOSINOPHIL # BLD MANUAL: 0.09 10*3/MM3 (ref 0–0.4)
EOSINOPHIL NFR BLD AUTO: ABNORMAL %
EOSINOPHIL NFR BLD MANUAL: 10 % (ref 0.3–6.2)
ERYTHROCYTE [DISTWIDTH] IN BLOOD BY AUTOMATED COUNT: 14.6 % (ref 12.3–15.4)
ERYTHROCYTE [DISTWIDTH] IN BLOOD BY AUTOMATED COUNT: 16.5 % (ref 12.3–15.4)
ETHANOL BLD-MCNC: <10 MG/DL (ref 0–10)
ETHANOL UR QL: <0.01 %
EXTERNAL AMPHETAMINE SCREEN URINE: NEGATIVE
EXTERNAL BENZODIAZEPINE SCREEN URINE: NEGATIVE
EXTERNAL BUPRENORPHINE SCREEN URINE: NEGATIVE
EXTERNAL COCAINE SCREEN URINE: NEGATIVE
EXTERNAL MDMA: NEGATIVE
EXTERNAL METHADONE SCREEN URINE: POSITIVE
EXTERNAL METHAMPHETAMINE SCREEN URINE: NEGATIVE
EXTERNAL OPIATES SCREEN URINE: NEGATIVE
EXTERNAL OXYCODONE SCREEN URINE: NEGATIVE
EXTERNAL THC SCREEN URINE: NEGATIVE
FERRITIN SERPL-MCNC: 642 NG/ML (ref 13–150)
FLUAV SUBTYP SPEC NAA+PROBE: NOT DETECTED
FLUBV RNA ISLT QL NAA+PROBE: NOT DETECTED
FOLATE SERPL-MCNC: 17.3 NG/ML (ref 4.78–24.2)
GFR SERPL CREATININE-BSD FRML MDRD: 12 ML/MIN/1.73
GFR SERPL CREATININE-BSD FRML MDRD: ABNORMAL ML/MIN/{1.73_M2}
GLOBULIN SER CALC-MCNC: 2.8 GM/DL
GLOBULIN UR ELPH-MCNC: 2.5 GM/DL
GLUCOSE SERPL-MCNC: 78 MG/DL (ref 65–99)
GLUCOSE SERPL-MCNC: 82 MG/DL (ref 65–99)
GLUCOSE UR STRIP-MCNC: NEGATIVE MG/DL
GRAN CASTS URNS QL MICRO: ABNORMAL /LPF
HADV DNA SPEC NAA+PROBE: NOT DETECTED
HAV IGM SERPL QL IA: ABNORMAL
HBV CORE IGM SERPL QL IA: ABNORMAL
HBV SURFACE AG SERPL QL IA: ABNORMAL
HCO3 BLDA-SCNC: 10.9 MMOL/L (ref 22–28)
HCOV 229E RNA SPEC QL NAA+PROBE: NOT DETECTED
HCOV HKU1 RNA SPEC QL NAA+PROBE: NOT DETECTED
HCOV NL63 RNA SPEC QL NAA+PROBE: NOT DETECTED
HCOV OC43 RNA SPEC QL NAA+PROBE: NOT DETECTED
HCT VFR BLD AUTO: 23 % (ref 34–46.6)
HCT VFR BLD AUTO: 26.5 % (ref 34–46.6)
HCT VFR BLD CALC: 26.3 %
HCV AB SER DONR QL: REACTIVE
HGB BLD-MCNC: 8 G/DL (ref 12–15.9)
HGB BLD-MCNC: 9.2 G/DL (ref 12–15.9)
HGB UR QL STRIP.AUTO: ABNORMAL
HMPV RNA NPH QL NAA+NON-PROBE: NOT DETECTED
HPIV1 RNA SPEC QL NAA+PROBE: NOT DETECTED
HPIV2 RNA SPEC QL NAA+PROBE: NOT DETECTED
HPIV3 RNA NPH QL NAA+PROBE: NOT DETECTED
HPIV4 P GENE NPH QL NAA+PROBE: NOT DETECTED
HYALINE CASTS UR QL AUTO: ABNORMAL /LPF
HYPOCHROMIA BLD QL: ABNORMAL
INHALED O2 CONCENTRATION: 50 %
INR PPP: 3.31 (ref 0.9–1.1)
INTERNAL NEGATIVE CONTROL: NORMAL
INTERNAL POSITIVE CONTROL: NORMAL
IRON SATN MFR SERPL: 50 % (ref 20–50)
IRON SERPL-MCNC: 98 MCG/DL (ref 37–145)
KETONES UR QL STRIP: NEGATIVE
LEUKOCYTE ESTERASE UR QL STRIP.AUTO: ABNORMAL
LYMPHOCYTES # BLD AUTO: ABNORMAL 10*3/UL
LYMPHOCYTES # BLD MANUAL: 0.32 10*3/MM3 (ref 0.7–3.1)
LYMPHOCYTES # BLD MANUAL: 1.15 10*3/MM3 (ref 0.7–3.1)
LYMPHOCYTES NFR BLD AUTO: ABNORMAL %
LYMPHOCYTES NFR BLD MANUAL: 34 % (ref 19.6–45.3)
LYMPHOCYTES NFR BLD MANUAL: 6 % (ref 19.6–45.3)
LYMPHOCYTES NFR BLD MANUAL: 6 % (ref 5–12)
Lab: ABNORMAL
Lab: NORMAL
M PNEUMO IGG SER IA-ACNC: NOT DETECTED
MAGNESIUM SERPL-MCNC: 1.7 MG/DL (ref 1.6–2.6)
MCH RBC QN AUTO: 29.5 PG (ref 26.6–33)
MCH RBC QN AUTO: 30.1 PG (ref 26.6–33)
MCHC RBC AUTO-ENTMCNC: 34.7 G/DL (ref 31.5–35.7)
MCHC RBC AUTO-ENTMCNC: 34.8 G/DL (ref 31.5–35.7)
MCV RBC AUTO: 84.9 FL (ref 79–97)
MCV RBC AUTO: 86.5 FL (ref 79–97)
MDMA CUT-OFF: ABNORMAL
METHADONE CUT-OFF: ABNORMAL
METHADONE UR QL SCN: POSITIVE
METHAMPHETAMINE CUT-OFF: ABNORMAL
METHGB BLD QL: 0.2 % (ref 0–1.5)
MICROORGANISM/AGENT SPEC: ABNORMAL
MODALITY: ABNORMAL
MONOCYTES # BLD AUTO: 1.15 10*3/MM3 (ref 0.1–0.9)
MONOCYTES # BLD MANUAL: 0.06 10*3/MM3 (ref 0.1–0.9)
MONOCYTES NFR BLD AUTO: ABNORMAL %
MONOCYTES NFR BLD MANUAL: 6 % (ref 5–12)
NEUTROPHILS # BLD AUTO: 16.93 10*3/MM3 (ref 1.7–7)
NEUTROPHILS # BLD MANUAL: 0.47 10*3/MM3 (ref 1.7–7)
NEUTROPHILS NFR BLD AUTO: ABNORMAL %
NEUTROPHILS NFR BLD MANUAL: 40 % (ref 42.7–76)
NEUTROPHILS NFR BLD MANUAL: 50 % (ref 42.7–76)
NEUTS BAND NFR BLD MANUAL: 48 % (ref 0–5)
NITRITE UR QL STRIP: POSITIVE
NOTE: ABNORMAL
OPIATES CUT-OFF: ABNORMAL
OPIATES UR QL: NEGATIVE
OTHER ANTIBIOTIC SUSC ISLT: ABNORMAL
OXYCODONE CUT-OFF: ABNORMAL
OXYCODONE UR QL SCN: NEGATIVE
OXYHGB MFR BLDV: 86.6 % (ref 94–99)
PCO2 BLDA: 32.4 MM HG (ref 35–45)
PCO2 TEMP ADJ BLD: ABNORMAL MM[HG]
PCP UR QL SCN: NEGATIVE
PH BLDA: 7.13 PH UNITS (ref 7.35–7.45)
PH UR STRIP.AUTO: <=5 [PH] (ref 5–8)
PH, TEMP CORRECTED: ABNORMAL
PLATELET # BLD AUTO: 12 10*3/MM3 (ref 140–450)
PLATELET # BLD AUTO: 25 10*3/MM3 (ref 140–450)
PLATELET BLD QL SMEAR: ABNORMAL
PMV BLD AUTO: ABNORMAL FL
PO2 BLDA: 63 MM HG (ref 75–100)
PO2 TEMP ADJ BLD: ABNORMAL MM[HG]
POTASSIUM SERPL-SCNC: 5 MMOL/L (ref 3.5–5.2)
POTASSIUM SERPL-SCNC: 6 MMOL/L (ref 3.5–5.2)
PROCALCITONIN SERPL-MCNC: >100 NG/ML (ref 0–0.25)
PROPOXYPH UR QL: NEGATIVE
PROT SERPL-MCNC: 4.7 G/DL (ref 6–8.5)
PROT SERPL-MCNC: 5.6 G/DL (ref 6–8.5)
PROT UR QL STRIP: ABNORMAL
PROTHROMBIN TIME: 34.2 SECONDS (ref 12–15.1)
RBC # BLD AUTO: 2.66 10*6/MM3 (ref 3.77–5.28)
RBC # BLD AUTO: 3.12 10*6/MM3 (ref 3.77–5.28)
RBC # UR: ABNORMAL /HPF
RBC MORPH BLD: ABNORMAL
REF LAB TEST METHOD: ABNORMAL
REQUEST PROBLEM: NORMAL
RH BLD: POSITIVE
RHINOVIRUS RNA SPEC NAA+PROBE: NOT DETECTED
RSV RNA NPH QL NAA+NON-PROBE: NOT DETECTED
SALICYLATES SERPL-MCNC: <0.3 MG/DL
SAO2 % BLDCOA: 88.2 % (ref 94–100)
SARS-COV-2 RNA NPH QL NAA+NON-PROBE: NOT DETECTED
SCAN SLIDE: NORMAL
SMALL PLATELETS BLD QL SMEAR: ABNORMAL
SODIUM SERPL-SCNC: 137 MMOL/L (ref 136–145)
SODIUM SERPL-SCNC: 140 MMOL/L (ref 136–145)
SP GR UR STRIP: 1.02 (ref 1–1.03)
SQUAMOUS #/AREA URNS HPF: ABNORMAL /HPF
T&S EXPIRATION DATE: NORMAL
THC CUT-OFF: ABNORMAL
TIBC SERPL-MCNC: 197 MCG/DL
TRICYCLICS UR QL SCN: NEGATIVE
TROPONIN T SERPL-MCNC: <0.01 NG/ML (ref 0–0.03)
UIBC SERPL-MCNC: 99 MCG/DL (ref 112–346)
UROBILINOGEN UR QL STRIP: ABNORMAL
VENTILATOR MODE: ABNORMAL
VIT B12 SERPL-MCNC: 1859 PG/ML (ref 211–946)
WBC # BLD AUTO: 0.94 10*3/MM3 (ref 3.4–10.8)
WBC # BLD AUTO: 19.24 10*3/MM3 (ref 3.4–10.8)
WBC # BLD AUTO: NORMAL 10*3/MM3
WBC MORPH BLD: NORMAL
WBC UR QL AUTO: ABNORMAL /HPF

## 2021-01-01 PROCEDURE — 99220 PR INITIAL OBSERVATION CARE/DAY 70 MINUTES: CPT | Performed by: INTERNAL MEDICINE

## 2021-01-01 PROCEDURE — 96365 THER/PROPH/DIAG IV INF INIT: CPT

## 2021-01-01 PROCEDURE — 87150 DNA/RNA AMPLIFIED PROBE: CPT | Performed by: EMERGENCY MEDICINE

## 2021-01-01 PROCEDURE — 94799 UNLISTED PULMONARY SVC/PX: CPT

## 2021-01-01 PROCEDURE — 82140 ASSAY OF AMMONIA: CPT | Performed by: EMERGENCY MEDICINE

## 2021-01-01 PROCEDURE — 82375 ASSAY CARBOXYHB QUANT: CPT

## 2021-01-01 PROCEDURE — 86900 BLOOD TYPING SEROLOGIC ABO: CPT | Performed by: EMERGENCY MEDICINE

## 2021-01-01 PROCEDURE — 84145 PROCALCITONIN (PCT): CPT | Performed by: EMERGENCY MEDICINE

## 2021-01-01 PROCEDURE — 99443 PR PHYS/QHP TELEPHONE EVALUATION 21-30 MIN: CPT | Performed by: FAMILY MEDICINE

## 2021-01-01 PROCEDURE — 25010000002 MIDAZOLAM 50 MG/10ML SOLUTION 10 ML VIAL: Performed by: EMERGENCY MEDICINE

## 2021-01-01 PROCEDURE — 93005 ELECTROCARDIOGRAM TRACING: CPT | Performed by: EMERGENCY MEDICINE

## 2021-01-01 PROCEDURE — 86850 RBC ANTIBODY SCREEN: CPT | Performed by: EMERGENCY MEDICINE

## 2021-01-01 PROCEDURE — 36430 TRANSFUSION BLD/BLD COMPNT: CPT

## 2021-01-01 PROCEDURE — 87186 SC STD MICRODIL/AGAR DIL: CPT | Performed by: EMERGENCY MEDICINE

## 2021-01-01 PROCEDURE — 82077 ASSAY SPEC XCP UR&BREATH IA: CPT | Performed by: EMERGENCY MEDICINE

## 2021-01-01 PROCEDURE — 87040 BLOOD CULTURE FOR BACTERIA: CPT | Performed by: EMERGENCY MEDICINE

## 2021-01-01 PROCEDURE — 80074 ACUTE HEPATITIS PANEL: CPT | Performed by: EMERGENCY MEDICINE

## 2021-01-01 PROCEDURE — 86901 BLOOD TYPING SEROLOGIC RH(D): CPT | Performed by: EMERGENCY MEDICINE

## 2021-01-01 PROCEDURE — 25010000002 VANCOMYCIN 5 G RECONSTITUTED SOLUTION 5,000 MG VIAL: Performed by: EMERGENCY MEDICINE

## 2021-01-01 PROCEDURE — 96160 PT-FOCUSED HLTH RISK ASSMT: CPT | Performed by: FAMILY MEDICINE

## 2021-01-01 PROCEDURE — 81025 URINE PREGNANCY TEST: CPT | Performed by: FAMILY MEDICINE

## 2021-01-01 PROCEDURE — G0009 ADMIN PNEUMOCOCCAL VACCINE: HCPCS | Performed by: FAMILY MEDICINE

## 2021-01-01 PROCEDURE — C1751 CATH, INF, PER/CENT/MIDLINE: HCPCS

## 2021-01-01 PROCEDURE — 99395 PREV VISIT EST AGE 18-39: CPT | Performed by: FAMILY MEDICINE

## 2021-01-01 PROCEDURE — 99214 OFFICE O/P EST MOD 30 MIN: CPT | Performed by: NURSE PRACTITIONER

## 2021-01-01 PROCEDURE — 96366 THER/PROPH/DIAG IV INF ADDON: CPT

## 2021-01-01 PROCEDURE — 70450 CT HEAD/BRAIN W/O DYE: CPT

## 2021-01-01 PROCEDURE — 96368 THER/DIAG CONCURRENT INF: CPT

## 2021-01-01 PROCEDURE — 83050 HGB METHEMOGLOBIN QUAN: CPT

## 2021-01-01 PROCEDURE — 25010000002 LORAZEPAM PER 2 MG: Performed by: EMERGENCY MEDICINE

## 2021-01-01 PROCEDURE — 87077 CULTURE AEROBIC IDENTIFY: CPT | Performed by: EMERGENCY MEDICINE

## 2021-01-01 PROCEDURE — 99215 OFFICE O/P EST HI 40 MIN: CPT | Performed by: NURSE PRACTITIONER

## 2021-01-01 PROCEDURE — 85025 COMPLETE CBC W/AUTO DIFF WBC: CPT | Performed by: EMERGENCY MEDICINE

## 2021-01-01 PROCEDURE — 80053 COMPREHEN METABOLIC PANEL: CPT | Performed by: EMERGENCY MEDICINE

## 2021-01-01 PROCEDURE — 71045 X-RAY EXAM CHEST 1 VIEW: CPT

## 2021-01-01 PROCEDURE — 81001 URINALYSIS AUTO W/SCOPE: CPT | Performed by: EMERGENCY MEDICINE

## 2021-01-01 PROCEDURE — 99292 CRITICAL CARE ADDL 30 MIN: CPT

## 2021-01-01 PROCEDURE — 81025 URINE PREGNANCY TEST: CPT | Performed by: EMERGENCY MEDICINE

## 2021-01-01 PROCEDURE — 99291 CRITICAL CARE FIRST HOUR: CPT

## 2021-01-01 PROCEDURE — G0439 PPPS, SUBSEQ VISIT: HCPCS | Performed by: FAMILY MEDICINE

## 2021-01-01 PROCEDURE — 1159F MED LIST DOCD IN RCRD: CPT | Performed by: FAMILY MEDICINE

## 2021-01-01 PROCEDURE — 31500 INSERT EMERGENCY AIRWAY: CPT

## 2021-01-01 PROCEDURE — 84484 ASSAY OF TROPONIN QUANT: CPT | Performed by: EMERGENCY MEDICINE

## 2021-01-01 PROCEDURE — 90732 PPSV23 VACC 2 YRS+ SUBQ/IM: CPT | Performed by: FAMILY MEDICINE

## 2021-01-01 PROCEDURE — 1125F AMNT PAIN NOTED PAIN PRSNT: CPT | Performed by: FAMILY MEDICINE

## 2021-01-01 PROCEDURE — P9612 CATHETERIZE FOR URINE SPEC: HCPCS

## 2021-01-01 PROCEDURE — 90792 PSYCH DIAG EVAL W/MED SRVCS: CPT | Performed by: NURSE PRACTITIONER

## 2021-01-01 PROCEDURE — 1170F FXNL STATUS ASSESSED: CPT | Performed by: FAMILY MEDICINE

## 2021-01-01 PROCEDURE — 71250 CT THORAX DX C-: CPT

## 2021-01-01 PROCEDURE — 80143 DRUG ASSAY ACETAMINOPHEN: CPT | Performed by: EMERGENCY MEDICINE

## 2021-01-01 PROCEDURE — 74176 CT ABD & PELVIS W/O CONTRAST: CPT

## 2021-01-01 PROCEDURE — 96376 TX/PRO/DX INJ SAME DRUG ADON: CPT

## 2021-01-01 PROCEDURE — 85007 BL SMEAR W/DIFF WBC COUNT: CPT | Performed by: EMERGENCY MEDICINE

## 2021-01-01 PROCEDURE — P9035 PLATELET PHERES LEUKOREDUCED: HCPCS

## 2021-01-01 PROCEDURE — 96375 TX/PRO/DX INJ NEW DRUG ADDON: CPT

## 2021-01-01 PROCEDURE — 82550 ASSAY OF CK (CPK): CPT | Performed by: EMERGENCY MEDICINE

## 2021-01-01 PROCEDURE — 85610 PROTHROMBIN TIME: CPT | Performed by: EMERGENCY MEDICINE

## 2021-01-01 PROCEDURE — 80306 DRUG TEST PRSMV INSTRMNT: CPT | Performed by: EMERGENCY MEDICINE

## 2021-01-01 PROCEDURE — 36600 WITHDRAWAL OF ARTERIAL BLOOD: CPT

## 2021-01-01 PROCEDURE — 25010000002 PIPERACILLIN SOD-TAZOBACTAM PER 1 G: Performed by: EMERGENCY MEDICINE

## 2021-01-01 PROCEDURE — 83605 ASSAY OF LACTIC ACID: CPT | Performed by: EMERGENCY MEDICINE

## 2021-01-01 PROCEDURE — 83735 ASSAY OF MAGNESIUM: CPT | Performed by: EMERGENCY MEDICINE

## 2021-01-01 PROCEDURE — 25010000002 FENTANYL CITRATE (PF) 50 MCG/ML SOLUTION 20 ML VIAL: Performed by: EMERGENCY MEDICINE

## 2021-01-01 PROCEDURE — 80179 DRUG ASSAY SALICYLATE: CPT | Performed by: EMERGENCY MEDICINE

## 2021-01-01 PROCEDURE — 82805 BLOOD GASES W/O2 SATURATION: CPT

## 2021-01-01 PROCEDURE — 94002 VENT MGMT INPAT INIT DAY: CPT

## 2021-01-01 PROCEDURE — 0202U NFCT DS 22 TRGT SARS-COV-2: CPT | Performed by: EMERGENCY MEDICINE

## 2021-01-01 RX ORDER — NYSTATIN 100000 U/G
CREAM TOPICAL 2 TIMES DAILY PRN
Qty: 30 G | Refills: 1 | Status: SHIPPED | OUTPATIENT
Start: 2021-01-01

## 2021-01-01 RX ORDER — FEXOFENADINE HCL 180 MG/1
180 TABLET ORAL DAILY
Qty: 90 TABLET | Refills: 1 | Status: SHIPPED | OUTPATIENT
Start: 2021-01-01 | End: 2021-01-01 | Stop reason: SDUPTHER

## 2021-01-01 RX ORDER — TRAZODONE HYDROCHLORIDE 50 MG/1
TABLET ORAL
Qty: 30 TABLET | Refills: 0 | Status: SHIPPED | OUTPATIENT
Start: 2021-01-01 | End: 2021-01-01 | Stop reason: SDUPTHER

## 2021-01-01 RX ORDER — ONDANSETRON 8 MG/1
8 TABLET, ORALLY DISINTEGRATING ORAL EVERY 8 HOURS PRN
Qty: 20 TABLET | Refills: 0 | Status: SHIPPED | OUTPATIENT
Start: 2021-01-01 | End: 2021-01-01

## 2021-01-01 RX ORDER — LAMOTRIGINE 25 MG/1
TABLET ORAL
Qty: 45 TABLET | Refills: 0 | Status: SHIPPED | OUTPATIENT
Start: 2021-01-01 | End: 2021-01-01

## 2021-01-01 RX ORDER — SULFAMETHOXAZOLE AND TRIMETHOPRIM 200; 40 MG/5ML; MG/5ML
SUSPENSION ORAL 2 TIMES DAILY
COMMUNITY
End: 2021-01-01

## 2021-01-01 RX ORDER — VALACYCLOVIR HYDROCHLORIDE 1 G/1
1000 TABLET, FILM COATED ORAL 2 TIMES DAILY PRN
Qty: 30 TABLET | Refills: 2 | Status: SHIPPED | OUTPATIENT
Start: 2021-01-01

## 2021-01-01 RX ORDER — TRAZODONE HYDROCHLORIDE 50 MG/1
50 TABLET ORAL NIGHTLY
Qty: 30 TABLET | Refills: 0 | Status: SHIPPED | OUTPATIENT
Start: 2021-01-01 | End: 2021-01-01

## 2021-01-01 RX ORDER — ERGOCALCIFEROL 1.25 MG/1
50000 CAPSULE ORAL 2 TIMES WEEKLY
Qty: 10 CAPSULE | Refills: 5 | Status: SHIPPED | OUTPATIENT
Start: 2021-01-01

## 2021-01-01 RX ORDER — CLINDAMYCIN HYDROCHLORIDE 300 MG/1
CAPSULE ORAL
COMMUNITY
Start: 2021-01-01 | End: 2021-01-01

## 2021-01-01 RX ORDER — FUROSEMIDE 40 MG/1
60 TABLET ORAL 2 TIMES DAILY PRN
Qty: 90 TABLET | Refills: 3 | Status: SHIPPED | OUTPATIENT
Start: 2021-01-01

## 2021-01-01 RX ORDER — ONDANSETRON 8 MG/1
8 TABLET, ORALLY DISINTEGRATING ORAL EVERY 8 HOURS PRN
Qty: 20 TABLET | Refills: 0 | Status: SHIPPED | OUTPATIENT
Start: 2021-01-01 | End: 2021-01-01 | Stop reason: SDUPTHER

## 2021-01-01 RX ORDER — LORAZEPAM 2 MG/ML
2 INJECTION INTRAMUSCULAR ONCE
Status: COMPLETED | OUTPATIENT
Start: 2021-01-01 | End: 2021-01-01

## 2021-01-01 RX ORDER — SERTRALINE HYDROCHLORIDE 100 MG/1
100 TABLET, FILM COATED ORAL DAILY
COMMUNITY

## 2021-01-01 RX ORDER — TRAZODONE HYDROCHLORIDE 50 MG/1
50 TABLET ORAL NIGHTLY
Qty: 90 TABLET | Refills: 3 | Status: SHIPPED | OUTPATIENT
Start: 2021-01-01

## 2021-01-01 RX ORDER — ARIPIPRAZOLE 5 MG/1
5 TABLET ORAL DAILY
Qty: 30 TABLET | Refills: 1 | Status: SHIPPED | OUTPATIENT
Start: 2021-01-01

## 2021-01-01 RX ORDER — HYDROXYZINE PAMOATE 25 MG/1
25 CAPSULE ORAL 3 TIMES DAILY PRN
Qty: 90 CAPSULE | Refills: 5 | Status: SHIPPED | OUTPATIENT
Start: 2021-01-01

## 2021-01-01 RX ORDER — PANTOPRAZOLE SODIUM 40 MG/10ML
80 INJECTION, POWDER, LYOPHILIZED, FOR SOLUTION INTRAVENOUS ONCE
Status: COMPLETED | OUTPATIENT
Start: 2021-01-01 | End: 2021-01-01

## 2021-01-01 RX ORDER — NADOLOL 20 MG/1
TABLET ORAL
COMMUNITY
Start: 2021-01-01

## 2021-01-01 RX ORDER — FEXOFENADINE HCL 180 MG/1
180 TABLET ORAL DAILY
Qty: 90 TABLET | Refills: 1 | Status: SHIPPED | OUTPATIENT
Start: 2021-01-01

## 2021-01-01 RX ORDER — LAMOTRIGINE 100 MG/1
100 TABLET ORAL DAILY
Qty: 90 TABLET | Refills: 0 | Status: SHIPPED | OUTPATIENT
Start: 2021-01-01 | End: 2021-01-01 | Stop reason: SDDI

## 2021-01-01 RX ORDER — CLINDAMYCIN HYDROCHLORIDE 300 MG/1
300 CAPSULE ORAL 3 TIMES DAILY
Qty: 30 CAPSULE | Refills: 0 | Status: SHIPPED | OUTPATIENT
Start: 2021-01-01 | End: 2021-01-01

## 2021-01-01 RX ADMIN — GLYCOPYRROLATE 0.4 MG: 0.2 INJECTION, SOLUTION INTRAMUSCULAR; INTRAVENOUS at 18:01

## 2021-01-01 RX ADMIN — VANCOMYCIN HYDROCHLORIDE 1500 MG: 5 INJECTION, POWDER, LYOPHILIZED, FOR SOLUTION INTRAVENOUS at 13:25

## 2021-01-01 RX ADMIN — TAZOBACTAM SODIUM AND PIPERACILLIN SODIUM 3.38 G: 375; 3 INJECTION, SOLUTION INTRAVENOUS at 12:56

## 2021-01-01 RX ADMIN — MIDAZOLAM HYDROCHLORIDE 1 MG/HR: 5 INJECTION, SOLUTION INTRAMUSCULAR; INTRAVENOUS at 11:43

## 2021-01-01 RX ADMIN — FENTANYL CITRATE 50 MCG/HR: 50 INJECTION INTRAVENOUS at 11:48

## 2021-01-01 RX ADMIN — PANTOPRAZOLE SODIUM 8 MG/HR: 40 INJECTION, POWDER, FOR SOLUTION INTRAVENOUS at 14:55

## 2021-01-01 RX ADMIN — PANTOPRAZOLE SODIUM 80 MG: 40 INJECTION, POWDER, FOR SOLUTION INTRAVENOUS at 14:51

## 2021-01-01 RX ADMIN — LORAZEPAM 2 MG: 2 INJECTION INTRAMUSCULAR; INTRAVENOUS at 16:15

## 2021-01-01 RX ADMIN — SODIUM CHLORIDE 1000 ML: 9 INJECTION, SOLUTION INTRAVENOUS at 12:05

## 2021-01-06 NOTE — TELEPHONE ENCOUNTER
She has a f/u appt with me next week, but she does need to reschedule her appointment with Dr. Mcconnell.  She needs to establish with a new hematologist as she was apparently discharged from .

## 2021-01-06 NOTE — TELEPHONE ENCOUNTER
PATIENT CALLED AND STATED THAT SHE'S BEEN HAVING TROUBLE WITH TRANSPORTATION AND SHE HAS BEEN IN THE PROCESS OF MOVING AND SHE WILL BE IN WITHIN THE NEXT WEEK OR SO TO GET HER BLOOD WORK DONE.      CALL BACK NUMBER: [phone]

## 2021-02-09 NOTE — PROGRESS NOTES
"Chief Complaint  Anxiety, pression, insomnia, and opioid dependency on agonist therapy    Subjective          Lindsey Aldridge presents to [facility] by herself for an initial evaluation.    History of Present Illness: Lindsey states, \"I have had addiction my whole life, ever since my dad  and I lost my Papaw.  I have not dealt with my grief and I use drugs to cope.\"  Lindsey told me that she began getting help for her mental health at age 15.  She tells me she began Prozac which made her suicidal and then was started on Zoloft which \"helped a lot.\"  Lindsey tells me she was able to handle her emotions and noticed a \"big change\" in her mood.  Lindsey states, \"the Zoloft is not helping now.\"  Lindsey tells me that she began using alcohol at the age of 16 after her father's death.  Lindsey was 14 when her father  from a cardiac arrest.  She states, \"we are from a very wealthy family but my dad was stressed.  He was very popular with the ladies and had multiple affairs.\"  Lindsey has very disorganized and tangential speech during the visit.  She is preoccupied with telling me all the events that occurred in her mother and father's marriage but is not focusing on her current symptoms.  She is often difficult to redirect and bring back to the topic of conversation.  Lindsey tells me that she does not have custody of her 8-year-old son and her goal is to resume custody.  She tells me that she is very concerned as she has a warrant out for her arrest at this time.  Lindsey tells me that she has had many health problems the majority of her life.  She tells me that she was born with biliary atresia and \"bile was backing up into my body.\"  She went into immediate surgery and the surgeons \"knicked her bile duct.\"  Lindsey tells me that she was a \"fragile child\" and her parents would often spoil her due to her health problems.  Lindsey tells me that she was a good child and good " "student but was often in trouble for truancy.  She tells me that, due to her poor immune system, she missed a lot of school.  Lindsey did not finish high school and did not complete the GED program.  She states, \"my mom went from being my mom to my friend after my dad .  She did not force me to go to school.\"  Lindsey often rambles about her family's history and past addiction problems.  Lindsey rates her cravings for opioids and 8 out of 10 with 10 being the most severe.  She does endorse using heroin approximately 2 to 3 weeks ago but uses cognitive distortions such as blame, minimization, and justification for her drug use.  Lindsey tells me that she stays anxious unless she is busy or distracted.  She states that she is depressed because she is unable to care for her son.  She endorses depressive symptoms such as depressed mood, anhedonia, difficulty with sleep, fatigue, decreased appetite, and feelings of hopelessness.  Lindsey's medical comorbidities include chronic kidney disease, splenomegaly, chronic neutropenia and anemia, electrolyte imbalances, vitamin deficiency, hypertension, history of C. difficile, history of MRSA, hepatitis C, biliary atresia, and decompensated liver cirrhosis.  As mentioned above, her current medication regimen includes Zoloft and Vistaril.  She is also taking methadone for opioid dependency.  Denies any side effects of her current medication regiment.  She denies any problems with SI/HI/AVH.    Past Psychiatric History: Lindsey endorses her depressive symptoms beginning in high school after her father's death at the age of 14.  Lindsey has also been in and out of several rehabilitation programs.  It is very difficult to assess the chronological order of her rehabilitation visits.  It appears that her first admission was to the [facility] at age 17 or 18.  She was discharged and began attending AA meetings due to her alcohol use disorder.  She also tells me that she has stayed at " "[facility], [facility], and [facility].  Lindsey tells me that she has not been able to complete any of her rehabilitation programs, often due to her medical comorbidities.  She tells me that \"I have been to [facility] several times.  It is my home away from home.\"  It does not appear that Lindsey has had any inpatient psychiatric hospitalizations.  As mentioned above, her past psychiatric medications include Prozac, Zoloft, and Vistaril.    Substance Use/Abuse: Lindsey began using alcohol at the age of 16.  She tells me that she would drink socially at parties until she \"blacked out.\"  Lindsey had years of alcohol addiction.  She tells me that she last drank on New Year's Roxie and states \"it was just a little bit.\"  She denies any cravings for alcohol.  She received her first DUI charge at the age of 18.  Lindsey currently does not have a license but tells me that she drives when necessary to take her mom to appointments or go to the grocery store.  Lindsey tells me that she began using pills almost immediately after she began drinking alcohol.  She tells me that she began with methadone which she snorted.  She tells me here use moved to [facility] which she also began snorting and eventually ejecting intravenously.  She did have one period of sobriety for 2 years from opioids.  She tells me that from [place] she moved to [place] which were very difficult to obtain so she began using heroin.  She tells me that she began attending a methadone clinic during pregnancy. Lindsey blames her heroin use on a \"47-year-old man\" she was in a relationship with. Lindsey states, \"I liked heroin because it took my depression away.\"  She tells me that she was able to have custody of her child for 4 years but blames this \"man\" for all of her problems.  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  She tells me that she was supposed to be on house arrest due to a drug " "paraphernalia charge..  She tells me that it was difficult to go to [facility] every week to provide urine drug screens and each urine drug screen cost $40.  She states, \"I had no money and no transportation.\"  Lindsey did not fulfill her responsibilities or attend her court hearing; therefore, she has a warrant for her arrest due to a charge of drug paraphernalia.      Family Psychiatric History: It is difficult to assess Lindsey's family psychiatric history but it appears there is a strong line of substance use disorder on both her mother and father's side of the family.    Social History: Lindsey is currently living in [place], [place] with her significant other, Austin.  She receives her Social Security and disability income since the age of 18.  She has 9th grade education level.  As mentioned above, she has an 8-year-old biological son whom is living with her paternal aunt.  She is unsure who the biological father is of her son.  Lindsey tells me that she is close with her biological mother.  She is currently living in a hotel and has limited transportation.  She tells me her goals for treatment are to get her son back and receive a liver transplant.     Objective   Vital Signs:   /78 (BP Location: Left arm)   Pulse 80   Temp 98.2 °F (36.8 °C) (Infrared)   Resp 18   Ht 160 cm (63\")   Wt 73.5 kg (162 lb)   BMI 28.70 kg/m²       PHQ-9 Score:   PHQ-9 Total Score: 12     Mental Status Exam:   Hygiene:   good  Cooperation:  Evasive  Eye Contact:  Fair  Psychomotor Behavior:  Slow  Affect:  Blunted  Mood: depressed  Speech:  Minimal, Rambling and Slurred  Thought Process:  Disorganized and Tangential  Thought Content:  Mood congruent  Suicidal:  None  Homicidal:  None  Hallucinations:  None  Delusion:  None  Memory:  Intact  Orientation:  Person, Place, Time and Situation  Reliability:  poor  Insight:  Poor  Judgement:  Poor  Impulse Control:  Poor  Physical/Medical Issues:  Yes chronic kideney disease, " liver impairment, spleenomegly, chronic neutropenia and anemia, Hep. C, biliary atresia, and HTN     Current Medications:   Current Outpatient Medications   Medication Sig Dispense Refill   • famotidine (PEPCID) 20 MG tablet Take 20 mg by mouth Daily.     • furosemide (LASIX) 40 MG tablet Take 60 mg by mouth 2 (Two) Times a Day As Needed.     • Melatonin 10 MG capsule Take 1 each by mouth Every Night.     • methadone (DOLOPHINE) 10 MG tablet Take 70 mg by mouth 1 (One) Time ,      • multivitamin with minerals (MULTIVITAMIN ADULT PO) Take 1 tablet by mouth Daily.     • potassium chloride (K-DUR,KLOR-CON) 10 MEQ CR tablet TAKE ONE TABLET BY MOUTH DAILY 30 tablet 5   • sertraline (ZOLOFT) 50 MG tablet Take 100 mg by mouth Daily.     • simethicone (MYLICON) 80 MG chewable tablet Chew 80 mg Every 6 (Six) Hours As Needed for Flatulence.     • sodium bicarbonate 650 MG tablet Take 650 mg by mouth 3 (Three) Times a Day.     • ursodiol (ACTIGALL) 500 MG tablet Take 500 mg by mouth 2 (two) times a day.     • vitamin D (ERGOCALCIFEROL) 1.25 MG (37393 UT) capsule capsule Take 1 capsule by mouth 1 (One) Time Per Week. 5 capsule 5   • Zinc 50 MG capsule Take  by mouth.     • cholestyramine light 4 g packet Take 4 g by mouth 2 (Two) Times a Day.     • hydrOXYzine pamoate (VISTARIL) 25 MG capsule Take 1 capsule by mouth 3 (Three) Times a Day As Needed for Itching or Anxiety. 90 capsule 5   • lamoTRIgine (LaMICtal) 25 MG tablet Take 1 tablet by mouth Daily for 14 days, THEN 2 tablets Daily for 14 days. 45 tablet 0   • traZODone (DESYREL) 50 MG tablet Take 1 tablet by mouth Every Night. 30 tablet 0     No current facility-administered medications for this visit.    Physical Exam  Vitals signs and nursing note reviewed.   Constitutional:       Appearance: Normal appearance. She is well-developed.   Musculoskeletal: Normal range of motion.   Skin:     General: Skin is warm and dry.   Neurological:      Mental Status: She is alert and  oriented to person, place, and time.   Psychiatric:         Attention and Perception: She is inattentive.         Mood and Affect: Mood is depressed. Affect is blunt.         Speech: Speech is slurred.         Behavior: Behavior normal. Behavior is cooperative.         Thought Content: Thought content normal.         Cognition and Memory: Cognition normal.         Judgment: Judgment is inappropriate.        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  Lindsey has a history of polysubstance use.  She is on methadone for maintenance but continues to use substances when they are available.  She uses cognitive distortions such as justification, minimization, and blame for her drug use.  She has multiple health problems, some related to her biliary atresia and some related to history of substance use.  Lindsey has goals to get on a transplant list and to get custody of her son back.  She would like to enter into therapy and medication management to improve her depressive and anxious symptoms.  She is also experiencing insomnia and strong cravings for drugs.  Lindsey's treatment will be significantly limited due to her financial  difficulties, lack of education, lack of transportation, and severe history of drug use.  I encouraged Lindsey to sign a release of information for her nephrologist and gastroenterologist.  She is also going to consult with them in regards to starting clonidine for opioid cravings.  -Initiate lamotrigine 25 mg for 2 weeks then increase to 50 mg daily for depression and anxiety.  I explained the purpose of this medication to Lindsey.  We discussed the risk versus benefits, as well as potential side effects.  I encouraged her to also mention that she will be starting this medication to her nephrologist and gastroenterologist.  -Initiate trazodone 50 mg nightly for insomnia.  I explained the purpose of this medication to Lindsey.  We discussed the risk versus benefits of adding this medication to her regiment, as well as potential side effects.  She verbalized understanding.  -Continue Zoloft 50 mg daily for depression and anxiety.  This medication is prescribed by her PCP.  -Continue Vistaril 25 mg times daily as needed for anxiety.  This medication is prescribed by her PCP.  -Continue methadone as prescribed by her methadone clinic.  This medication did not show up on her [facility] report and we will be looking into that.  -I collected a UDS in the office today.  Patient preliminary screening was positive for methadone and negative for all other substances as expected.  I will review the official results.Offical results were positive for ethyl glucuronide, ethyl sulfate, methadone, and gabapentin. This is the likely explanation for Lindsey's slurred speech and behaviors in the office.    TREATMENT PLAN/GOALS: Continue supportive psychotherapy efforts and medications as indicated. Treatment and medication options discussed during today's visit. Patient ackowledged and verbally consented to continue with current treatment plan and was educated on the importance of compliance with treatment and follow-up  appointments.    MEDICATION ISSUES:    We discussed risks, benefits, and side effects of the above medications and the patient was agreeable with the plan. Patient was educated on the importance of compliance with treatment and follow-up appointments.  Patient is agreeable to call the office with any worsening of symptoms or onset of side effects. Patient is agreeable to call 911 or go to the nearest ER should he/she begin having SI/HI.      Counseled patient regarding multimodal approach with healthy nutrition, healthy sleep, regular physical activity, social activities, counseling, and medications.      Coping skills reviewed and encouraged positive framing of thoughts     Assisted patient in processing above session content; acknowledged and normalized patient's thoughts, feelings, and concerns.  Applied  positive coping skills and behavior management in session.  Allowed patient to freely discuss issues without interruption or judgment. Provided safe, confidential environment to facilitate the development of positive therapeutic relationship and encourage open, honest communication. Assisted patient in identifying risk factors which would indicate the need for higher level of care including thoughts to harm self or others and/or self-harming behavior and encouraged patient to contact this office, call 911, or present to the nearest emergency room should any of these events occur. Discussed crisis intervention services and means to access.     MEDS ORDERED DURING VISIT:  New Medications Ordered This Visit   Medications   • lamoTRIgine (LaMICtal) 25 MG tablet     Sig: Take 1 tablet by mouth Daily for 14 days, THEN 2 tablets Daily for 14 days.     Dispense:  45 tablet     Refill:  0   • traZODone (DESYREL) 50 MG tablet     Sig: Take 1 tablet by mouth Every Night.     Dispense:  30 tablet     Refill:  0           Follow Up   Return in about 4 weeks (around 3/9/2021) for Medication Check.    Patient was given  instructions and counseling regarding her condition or for health maintenance advice. Please see specific information pulled into the AVS if appropriate.     This document has been electronically signed by JAVIER Melton  February 9, 2021 14:39 EST      This document has been electronically signed by JAVIER Wiseman, PMHNP-BC  February 9, 2021 14:39 EST    Part of this note may be an electronic transcription/translation of spoken language to printed text using the Dragon Dictation System.

## 2021-03-18 NOTE — TELEPHONE ENCOUNTER
Blood work will need to be done to determine if she needs blood.  I received a note yesterday stating her boyfriend is suspecting she is doing drugs again due to her fatigue.  I have placed an order for CBC.  She will need to follow up with GI as well and discuss worsening fatigue with them.

## 2021-03-18 NOTE — TELEPHONE ENCOUNTER
DEYANIRA IS FEELING EXTRA TIRED.  DOES SHE NEED BLOOD OR IS THERE ANYTHING THAT CAN BE DONE?    PHONE [phone]

## 2021-03-19 NOTE — TELEPHONE ENCOUNTER
Patient has been notified that she will need to discuss the lamictal with behavioral health. She also was inquiring about her blood test results in which she expressed that she had preformed today at [facility] due to fatigue. I stated that labs normally take a day or two to result  and would contact her once they have been resulted and reviewed by Dr. Arana.

## 2021-03-19 NOTE — TELEPHONE ENCOUNTER
I do not have the results back.  You are correct.  It typically takes a day to return.  If it is critical then she will hear from us over the weekend.
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
## 2021-03-19 NOTE — TELEPHONE ENCOUNTER
Pt called back and I advised her about going to get labs. She stated she doesn't like the new therapist she is seeing. She states she is feeling a lot more angry than usual. She think's it is due to the lamictal she put her on. She said she called her office and instead of prescribing something else she wants to up it. She wanted to get your opinion. Please advise. Thank you.

## 2021-03-20 NOTE — TELEPHONE ENCOUNTER
Received critical results from Jodi at [facility] lab on the morning of 3/20/2021 with platelet count 25 and white blood cell count 0.94.  Reviewed patient's chart to find long history of pancytopenia with hemoglobin actually in decent range for her without apparent need for urgent blood transfusion, however WBC and platelet counts currently at lowest level historically.  Contacted Dr. Arana due to being unfamiliar with this patient's health history and she recommended sending her to  ED as her hematology and gastroenterology specialists are both there.  Contacted patient with information and strongly advised proceeding directly to  ED and gave her lab values to write down.  She was initially hesitant to go to  ED as she would have preferred to just receive blood at a local ED, however I did explain that she did not necessarily need a blood transfusion with hemoglobin of 8.0.  The bigger concern at this time is lowest historical platelet and WBC levels.  She was agreeable to proceed to  ED and will contact her gastroenterologist.

## 2021-03-22 NOTE — TELEPHONE ENCOUNTER
The results came back critical overnight and were addressed with the patient.  This may be closed.

## 2021-04-16 NOTE — TELEPHONE ENCOUNTER
Caller: Lindsey Aldridge    Relationship to patient: Self    Best call back number: [phone]     Patient is needing:  PATIENT HAS AN APPOINTMENT SCHEDULED FOR April 20.  PATIENT SAID SHE COULD NOT COME FOR AN APPOINTMENT TODAY.  PATIENT SAID SHE HAS A FEVER, CHILLS, VOMITING, DIARRHEA, AND KNEE PAIN.  PATIENT IS REQUESTING A CALL BACK TO SEE IF SHE CAN GET SOMETHING CALLED IN FOR HER UNTIL SHE COMES IN FOR HER APPOINTMENT.

## 2021-04-16 NOTE — TELEPHONE ENCOUNTER
Spoke with patient, she is wanting to know if we can change pharmacy to [facility] ?

## 2021-04-20 NOTE — TELEPHONE ENCOUNTER
Patient's phone cut out.  I was able to hear her, but she was not able to hear me any more.  Please call patient and let her know I have sent in nystatin cream for a yeast infection.  I will order labs as well.  She requested to have a pap smear.  This would need to be done with a medicare wellness visit if she would like to schedule that.

## 2021-04-29 NOTE — PROGRESS NOTES
Patient Name: Lindsey Aldridge  MRN: [id number]   :  1990     Referring Physician: Darlin Arana DO    Chief Complaint:     ICD-10-CM ICD-9-CM   1. Anxiety  F41.9 300.00   2. Moderate episode of recurrent major depressive disorder (CMS/HCC)  F33.1 296.32       HPI:   Lindsey Aldridge is a 30 y.o. female who is here today for initial evaluation of Anxiety  and Depression.  Patient presents with end-stage liver disease.  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  States she does periodically have relapses.  States she is in need of a liver transplant however she has to be clean for 1 year before she is put on the list.  She is lost her son.  Patient states she has a warrant for her arrest for possession of drug paraphernalia charge.  States she agreed to go to rehab.  By report she had medical issues that she had to leave the rehab.  Because of this there is the warrant.  Patient lives in a hotel with her mom that has a lot of medical issues and her boyfriend.  Patient has lost custody of her son.  Patient tried Prozac in the past states it made her suicidal.  Patient states she tried Cymbalta and did not like that either.  Patient states Zoloft has been the best thing she is taking.  Now feels like it is not working as well.  Patient is anxious and has picked a hole in her scalp from anxiety.  Patient states she has issues because of something that happened 14 years ago however patient would not elaborate.  Patient states she is very easily agitated.    Past Medical History:   Past Medical History:   Diagnosis Date   • Biliary atresia    • Hepatitis C    • Liver failure (CMS/HCC)    • Renal disorder    • Self-injurious behavior     in past   • Splenic artery injury    • Splenic infarction        Past Surgical History:   Past Surgical History:   Procedure Laterality Date   •  SECTION         Social History:   Social History     Socioeconomic History   • Marital status: Single      Spouse name: Not on file   • Number of children: Not on file   • Years of education: Not on file   • Highest education level: Not on file   Tobacco Use   • Smoking status: Current Every Day Smoker     Packs/day: 1.00     Types: Cigarettes   • Smokeless tobacco: Never Used   Vaping Use   • Vaping Use: Some days   • Substances: Nicotine   • Devices: Disposable   Substance and Sexual Activity   • Alcohol use: Yes     Comment: OCCASIONALLY   • Drug use: Yes     Types: IV, Benzodiazepines, Heroin     Comment: HEROIN AND FENTANYL - LAST USE 5/10/19 2300   • Sexual activity: Defer       Family History:  Family History   Problem Relation Age of Onset   • Anxiety disorder Mother    • Depression Mother    • Heart disease Father    • Anxiety disorder Father    • Drug abuse Father    • Dementia Maternal Grandfather    • Depression Maternal Grandfather    • Alcohol abuse Maternal Grandmother    • Anxiety disorder Maternal Grandmother    • Dementia Maternal Grandmother    • Depression Maternal Grandmother    • Dementia Paternal Grandfather    • ADD / ADHD Cousin    • Drug abuse Cousin    • Seizures Cousin    • Bipolar disorder Neg Hx    • OCD Neg Hx    • Paranoid behavior Neg Hx    • Schizophrenia Neg Hx    • Self-Injurious Behavior  Neg Hx    • Suicide Attempts Neg Hx        Allergy:  Allergies   Allergen Reactions   • Aspirin Other (See Comments)     DETERIOTES BILE DUCTS       Current Medications:   Current Outpatient Medications   Medication Sig Dispense Refill   • cholestyramine light 4 g packet Take 4 g by mouth 2 (Two) Times a Day.     • famotidine (PEPCID) 20 MG tablet Take 20 mg by mouth Daily.     • fexofenadine (Allegra Allergy) 180 MG tablet Take 1 tablet by mouth Daily. 90 tablet 1   • furosemide (LASIX) 40 MG tablet Take 60 mg by mouth 2 (Two) Times a Day As Needed.     • hydrOXYzine pamoate (VISTARIL) 25 MG capsule Take 1 capsule by mouth 3 (Three) Times a Day As Needed for Itching or Anxiety. 90 capsule 5   •  Melatonin 10 MG capsule Take 1 each by mouth Every Night.     • methadone (DOLOPHINE) 10 MG tablet Take 80 mg by mouth 1 (One) Time ,      • multivitamin with minerals (MULTIVITAMIN ADULT PO) Take 1 tablet by mouth Daily.     • nystatin (MYCOSTATIN) 907242 UNIT/GM cream Apply  topically to the appropriate area as directed 2 (Two) Times a Day As Needed (yeast infection). 30 g 1   • potassium chloride (K-DUR,KLOR-CON) 10 MEQ CR tablet TAKE ONE TABLET BY MOUTH DAILY 30 tablet 5   • sertraline (ZOLOFT) 100 MG tablet Take 100 mg by mouth Daily.     • simethicone (MYLICON) 80 MG chewable tablet Chew 80 mg Every 6 (Six) Hours As Needed for Flatulence.     • sodium bicarbonate 650 MG tablet Take 650 mg by mouth 3 (Three) Times a Day.     • traZODone (DESYREL) 50 MG tablet TAKE ONE TABLET BY MOUTH ONCE NIGHTLY 30 tablet 0   • vitamin D (ERGOCALCIFEROL) 1.25 MG (60631 UT) capsule capsule Take 1 capsule by mouth 1 (One) Time Per Week. 5 capsule 5   • Zinc 50 MG capsule Take  by mouth.     • ARIPiprazole (ABILIFY) 5 MG tablet Take 1 tablet by mouth Daily. 30 tablet 1     No current facility-administered medications for this visit.       Lab Results:   Telephone on 03/18/2021   Component Date Value Ref Range Status   • WBC 03/19/2021 0.94* 3.40 - 10.80 10*3/mm3 Final   • RBC 03/19/2021 2.66* 3.77 - 5.28 10*6/mm3 Final   • Hemoglobin 03/19/2021 8.0* 12.0 - 15.9 g/dL Final   • Hematocrit 03/19/2021 23.0* 34.0 - 46.6 % Final   • MCV 03/19/2021 86.5  79.0 - 97.0 fL Final   • MCH 03/19/2021 30.1  26.6 - 33.0 pg Final   • MCHC 03/19/2021 34.8  31.5 - 35.7 g/dL Final   • RDW 03/19/2021 14.6  12.3 - 15.4 % Final   • Platelets 03/19/2021 25* 140 - 450 10*3/mm3 Final   • Neutrophil Rel % 03/19/2021 CANCELED   Final-Edited    Comment: Test not performed    Result canceled by the ancillary.     • Lymphocyte Rel % 03/19/2021 CANCELED   Final-Edited    Comment: Test not performed    Result canceled by the ancillary.     • Monocyte Rel %  03/19/2021 CANCELED   Final-Edited    Comment: Test not performed    Result canceled by the ancillary.     • Eosinophil Rel % 03/19/2021 CANCELED   Final-Edited    Comment: Test not performed    Result canceled by the ancillary.     • Lymphocytes Absolute 03/19/2021 CANCELED   Final-Edited    Comment: Test not performed    Result canceled by the ancillary.     • Eosinophils Absolute 03/19/2021 CANCELED   Final-Edited    Comment: Test not performed    Result canceled by the ancillary.     • Basophils Absolute 03/19/2021 CANCELED   Final-Edited    Comment: Test not performed    Result canceled by the ancillary.     • Neutrophil Rel % 03/19/2021 50.0  42.7 - 76.0 % Final   • Lymphocyte Rel % 03/19/2021 34.0  19.6 - 45.3 % Final   • Monocyte Rel % 03/19/2021 6.0  5.0 - 12.0 % Final   • Eosinophil Rel % 03/19/2021 10.0* 0.3 - 6.2 % Final   • Basophil Rel % 03/19/2021 0.0  0.0 - 1.5 % Final   • Neutrophils Absolute 03/19/2021 0.47* 1.70 - 7.00 10*3/mm3 Final   • Lymphocytes Absolute 03/19/2021 0.32* 0.70 - 3.10 10*3/mm3 Final   • Monocytes Absolute 03/19/2021 0.06* 0.10 - 0.90 10*3/mm3 Final   • Eosinophil Abs 03/19/2021 0.09  0.00 - 0.40 10*3/mm3 Final   • Basophils Absolute 03/19/2021 0.00  0.00 - 0.20 10*3/mm3 Final   • Differential Comment 03/19/2021 Comment   Final    WBC MORPHOLOGY               Normal                      Normal     N   • Comment 03/19/2021 Comment   Final    RBC MORPHOLOGY               Normal                      Normal     N   • Plt Comment 03/19/2021 Comment   Final    PLATELET MORPHOLOGY          Normal                      Normal     N   Office Visit on 02/09/2021   Component Date Value Ref Range Status   • External Amphetamine Screen Urine 02/09/2021 Negative   Final   • Amphetamine Cut-Off 02/09/2021 1000 ng/ml   Final   • External Benzodiazepine Screen Uri* 02/09/2021 Negative   Final   • Benzodiazipine Cut-Off 02/09/2021 300 ng/ml   Final   • External Cocaine Screen Urine 02/09/2021  Negative   Final   • Cocaine Cut-Off 02/09/2021 30 ng/ml   Final   • External THC Screen Urine 02/09/2021 Negative   Final   • THC Cut-Off 02/09/2021 50 ng/ml   Final   • External Methadone Screen Urine 02/09/2021 Positive   Final   • Methadone Cut-Off 02/09/2021 300 ng/ml   Final   • External Methamphetamine Screen Ur* 02/09/2021 Negative   Final   • Methamphetamine Cut-Off 02/09/2021 1000 ng/ml   Final   • External Oxycodone Screen Urine 02/09/2021 Negative   Final   • Oxycodone Cut-Off 02/09/2021 100 ng/ml   Final   • External Buprenorphine Screen Urine 02/09/2021 Negative   Final   • Buprenorphine Cut-Off 02/09/2021 10 ng/ml   Final   • External MDMA 02/09/2021 Negative   Final   • MDMA Cut-Off 02/09/2021 500 ng/ml   Final   • External Opiates Screen Urine 02/09/2021 Negative   Final   • Opiates Cut-Off 02/09/2021 300 ng/ml   Final       Review of Symptoms:   Review of Systems   Constitutional: Negative for activity change, appetite change, fatigue, unexpected weight gain and unexpected weight loss.   Respiratory: Positive for shortness of breath. Negative for wheezing.    Gastrointestinal: Negative for constipation, diarrhea, nausea and vomiting.   Musculoskeletal: Negative for gait problem.   Skin: Negative for dry skin and rash.   Neurological: Negative for dizziness, speech difficulty, weakness, light-headedness, headache, memory problem and confusion.   Psychiatric/Behavioral: Positive for decreased concentration, sleep disturbance, positive for hyperactivity, depressed mood and stress. Negative for agitation, behavioral problems, dysphoric mood, hallucinations, self-injury and suicidal ideas. The patient is nervous/anxious.        Physical Exam:   Physical Exam  Vitals and nursing note reviewed.   Constitutional:       General: She is not in acute distress.     Appearance: She is well-developed. She is not diaphoretic.   HENT:      Head: Normocephalic and atraumatic.   Eyes:      Conjunctiva/sclera:  "Conjunctivae normal.   Cardiovascular:      Rate and Rhythm: Normal rate.   Pulmonary:      Effort: Pulmonary effort is normal. No respiratory distress.   Musculoskeletal:         General: Normal range of motion.      Cervical back: Full passive range of motion without pain and normal range of motion.   Skin:     General: Skin is warm and dry.   Neurological:      Mental Status: She is alert and oriented to person, place, and time.   Psychiatric:         Mood and Affect: Mood is anxious and depressed. Affect is not labile, blunt, angry or inappropriate.         Speech: Speech is not rapid and pressured or tangential.         Behavior: Behavior normal. Behavior is not agitated, slowed, aggressive, withdrawn, hyperactive or combative. Behavior is cooperative.         Thought Content: Thought content normal. Thought content is not paranoid or delusional. Thought content does not include homicidal or suicidal ideation. Thought content does not include homicidal or suicidal plan.         Judgment: Judgment normal.       Blood pressure 114/72, height 160 cm (63\"), weight 76.5 kg (168 lb 9.6 oz).  Body mass index is 29.87 kg/m².     Mental Status Exam:   Appearance: appropriate  Hygiene:   fair  Cooperation:  Cooperative  Eye Contact:  Downcast  Psychomotor Behavior:  Slow  Mood:anxious, depressed and irritable  Affect:  Restricted  Hopelessness: Denies  Speech:  Normal  Thought Process:  Goal directed  Thought Content:  Normal  Suicidal:  None  Homicidal:  None  Hallucinations:  None  Delusion:  None  Memory:  Intact  Orientation:  Person, Place, Time and Situation  Reliability:  good  Insight:  Good and Fair  Judgement:  Fair  Impulse Control:  Fair  Physical/Medical Issues:  No     PHQ-9 Depression Screening  Little interest or pleasure in doing things? 3   Feeling down, depressed, or hopeless? 3   Trouble falling or staying asleep, or sleeping too much? 3   Feeling tired or having little energy? 2   Poor appetite or " overeating? 3   Feeling bad about yourself - or that you are a failure or have let yourself or your family down? 3   Trouble concentrating on things, such as reading the newspaper or watching television? 3   Moving or speaking so slowly that other people could have noticed? Or the opposite - being so fidgety or restless that you have been moving around a lot more than usual? 1   Thoughts that you would be better off dead, or of hurting yourself in some way? 0   PHQ-9 Total Score 21   If you checked off any problems, how difficult have these problems made it for you to do your work, take care of things at home, or get along with other people?        Assessment/Plan:   Diagnoses and all orders for this visit:    1. Anxiety (Primary)  -     Ambulatory Referral to Behavioral Health    2. Moderate episode of recurrent major depressive disorder (CMS/HCC)  -     ARIPiprazole (ABILIFY) 5 MG tablet; Take 1 tablet by mouth Daily.  Dispense: 30 tablet; Refill: 1    Add Abilify 5 mg p.o. daily.  Refer for therapy.    A psychological evaluation was conducted in order to assess past and current level of functioning. Areas assessed included, but were not limited to: perception of social support, perception of ability to face and deal with challenges in life (positive functioning), anxiety symptoms, depressive symptoms, perspective on beliefs/belief system, coping skills for stress, intelligence level,  Therapeutic rapport was established. Interventions conducted today were geared towards incorporating medication management along with support for continued therapy. Education was also provided as to the med management with this provider and what to expect in subsequent sessions.    We discussed risks, benefits,goals and side effects of the above medication and the patient was agreeable with the plan.Patient was educated on the importance of compliance with treatment and follow-up appointments. Patient is aware to contact the [facility] with any worsening of symptoms. To call for questions or concerns and return early if necessary. Patent is agreeable to go to the Emergency Department or call 911 should they begin SI/HI.     Treatment Plan:   Discussed risks, benefits, and alternatives of medication. Encouraged healthy habits (eating, exercise and sleep). Call if any questions or problems arise. Medication reconciled. Controlled substance monitoring report reviewed. Provided psychoeducation.. Discussed coping strategies and current stressors. Set appropriate boundaries and limits for patient's well-being. Use distraction techniques to improve symptoms. Access support networks.      Return in about 4 weeks (around 5/27/2021) for Follow Up 30 min.    Nely Lee, JAVIER

## 2021-05-09 PROBLEM — L03.119 CELLULITIS OF LOWER EXTREMITY: Status: ACTIVE | Noted: 2021-01-01

## 2021-05-21 NOTE — TELEPHONE ENCOUNTER
"Patient had came into the office yesterday afternoon around 5PM and asked about the results of her lab work and pap from April, pt was informed of pap results by Isabell but labs did not have result note attached so Isabell had me and Dr Arana look into this. When I went to speak with patient about lab work she had left already and Isabell stated patient had said she would come back in the morning to the lab because she was pretty sure he sample from April had clotted. We do show CBC was canceled but other labs were resulted. Per written results by Dr Arana I was going to inform patient of labs as followed :  \"Kidney function is low, but stable. Liver enzymes elevated. Blood count was not completed and we can redraw this. Iron was elevated. Vit D is low, RX for this was sent in.\"   Pt sees [facility], she should f/u with both of those Drs regarding lab results.   I attempted to contact patient this morning to inform her of this since I was unable to yesterday evening, she did not answer at the # listed for her and there was no VM option, I attempted to call the # listed for her mother and it went straight to VM, did leave a message requesting she call me back.  "

## 2021-06-21 NOTE — PROGRESS NOTES
"Date: 2021    Name: Lindsey Aldridge  : 1990    Chief Complaint:   Chief Complaint   Patient presents with   • Leg Swelling     rt leg, warm to the touch, redness x 1 week       HPI:  Lindsey Aldridge is a 30 y.o. female presents with right leg swelling & redness that have been present for the past 7-10 days.  She has a history of cellulitis in her lower extremity.  H/O drug use, specifically heroin. Admits she has used her lower legs as injection sites in the past.  She has been taking methadone, weaning off at this time in preparation for scheduled 30 days in halfway at beginning of August. States she has been very stressed, broke up with boyfriend of 4 years.  Had to find a new place to live.  States she is trying to keep from using drugs, more difficult with decreased dose of methadone and increased stress.  Right lower leg does hurt.  Denies fever, chills, numbness, tingling, burning.  Denies drug use at this time.  Her nose has been bleeding, intermittently for the past week.  She does have nasal congestion due to allergies.  Is taking Allegra daily.  She does have a history of thrombocytopenia, chronic liver failure.  She is trying to stay clean so she can be considered for liver transplant, get custody of her son.  Denies fever, chills, sneezing, rhinorrhea, headache, dizziness, dark or sticky stool, hematuria.  Does bruise easily.    History:  The following portions of the patient's history were reviewed and updated as appropriate: allergies, current medications, past medical history, family history, surgical history, social history and problem list.     VS:  Vitals:    21 1139   BP: 112/72   Pulse: 83   Temp: 97.8 °F (36.6 °C)   TempSrc: Infrared   SpO2: 100%   Weight: 73.4 kg (161 lb 12.8 oz)   Height: 160 cm (63\")     Body mass index is 28.66 kg/m².    PE:  Physical Exam  Constitutional:       Appearance: She is not ill-appearing.   HENT:      Head: Normocephalic.      Right " Ear: External ear normal.      Left Ear: External ear normal.      Nose: Mucosal edema present.      Right Nostril: No foreign body or epistaxis.      Left Nostril: No foreign body or epistaxis.      Comments: Lesions bilateral nares  Eyes:      General: Scleral icterus present.      Conjunctiva/sclera: Conjunctivae normal.      Pupils: Pupils are equal, round, and reactive to light.   Cardiovascular:      Rate and Rhythm: Normal rate and regular rhythm.      Pulses: Normal pulses.      Heart sounds: Normal heart sounds.   Pulmonary:      Effort: Pulmonary effort is normal.      Breath sounds: Normal breath sounds.   Abdominal:      General: Abdomen is protuberant.   Musculoskeletal:      Cervical back: Normal range of motion and neck supple.      Right lower leg: Swelling and tenderness (erythema, serosanguinous drainage from one < 1mm site) present. 2+ Edema present.   Skin:     General: Skin is warm.      Capillary Refill: Capillary refill takes less than 2 seconds.      Coloration: Skin is jaundiced.   Neurological:      Mental Status: She is alert and oriented to person, place, and time.      Coordination: Coordination normal.      Gait: Gait normal.   Psychiatric:         Mood and Affect: Mood normal.         Behavior: Behavior normal.         Thought Content: Thought content normal.         Assessment/Plan:  Diagnoses and all orders for this visit:    1. Cellulitis of right lower extremity (Primary)  -     clindamycin (Cleocin) 300 MG capsule; Take 1 capsule by mouth 3 (Three) Times a Day for 10 days.  Dispense: 30 capsule; Refill: 0  -     Culture, Routine - Swab, Leg, Right     2. Nasal lesion  -     mupirocin (Bactroban) 2 % ointment; Apply  topically to the appropriate area as directed 2 (Two) Times a Day for 5 days.  Dispense: 22 g; Refill: 0  - Advised to use saline gel prn nasal dryness  - Continue taking allegra daily    3. Thrombocytopenia (CMS/HCC)    4. Chronic liver failure without hepatic coma  (CMS/[facility])        - Continue to abstain from drug use, alcohol    5. Polysubstance abuse (CMS/[facility])        - When asked what can be done to help support her desire to abstain from using drugs, she states she is doing well and motivated.       Return in about 2 weeks (around 7/5/2021) for Next scheduled follow up.

## 2021-07-27 LAB
BACTERIA BLD CULT: ABNORMAL
BH BB BLOOD EXPIRATION DATE: NORMAL
BH BB BLOOD EXPIRATION DATE: NORMAL
BH BB BLOOD TYPE BARCODE: 6200
BH BB BLOOD TYPE BARCODE: 6200
BH BB DISPENSE STATUS: NORMAL
BH BB DISPENSE STATUS: NORMAL
BH BB PRODUCT CODE: NORMAL
BH BB PRODUCT CODE: NORMAL
BH BB UNIT NUMBER: NORMAL
BH BB UNIT NUMBER: NORMAL
UNIT  ABO: NORMAL
UNIT  ABO: NORMAL
UNIT  RH: NORMAL
UNIT  RH: NORMAL

## 2021-07-29 LAB
BACTERIA SPEC AEROBE CULT: ABNORMAL
BACTERIA SPEC AEROBE CULT: ABNORMAL
GRAM STN SPEC: ABNORMAL
ISOLATED FROM: ABNORMAL
ISOLATED FROM: ABNORMAL

## 2025-05-05 NOTE — ASSESSMENT & PLAN NOTE
Scheduled procedure with Patient over the phone  Scheduled Via: Case request order for [facility]  Procedure date: 6/19/25  Procedure time: 1500, Arrival time 1400  Insurance confirmed as Payor: [facility] MEDICARE ADVANTAGE / Plan: [facility] MEDICARE ADVANTAGE [facility]OPOS / Product Type: MEDADV  , will be the same at time of procedure?: Yes      Latex Allergy No  Diabetic No  Insulin No  CGM/Pump? No - Will need to be removed for procedure  Sleep Apnea Yes  Defibrillator/Pacemaker No  Heart attack or stroke in last 6 months No   MRSA hx No  Do you have a bleeding or clotting disorder?  Do you take medications for weight loss or diabetes no  Semaglutides:Ozempic,Wegovy,Rybuslis, Trulicity , Byetta,Saxenda,Victoza, Mounjaro   Blood thinners: Coumadin (Warfarin) or Plavix No      Aspirin No     Allergy to steroid or contrast No  Fish oil No  Vitamins Yes  Herbal Supplements Yes     Secondary to cirrhosis.  Will monitor labs regularly.